# Patient Record
Sex: FEMALE | Employment: FULL TIME | URBAN - METROPOLITAN AREA
[De-identification: names, ages, dates, MRNs, and addresses within clinical notes are randomized per-mention and may not be internally consistent; named-entity substitution may affect disease eponyms.]

---

## 2017-04-07 ENCOUNTER — HOSPITAL ENCOUNTER (EMERGENCY)
Facility: CLINIC | Age: 47
Discharge: HOME | End: 2017-04-07
Attending: EMERGENCY MEDICINE | Admitting: EMERGENCY MEDICINE
Payer: COMMERCIAL

## 2017-04-07 VITALS
RESPIRATION RATE: 16 BRPM | DIASTOLIC BLOOD PRESSURE: 105 MMHG | HEART RATE: 83 BPM | WEIGHT: 216.49 LBS | SYSTOLIC BLOOD PRESSURE: 151 MMHG | HEIGHT: 60 IN | OXYGEN SATURATION: 96 % | BODY MASS INDEX: 42.5 KG/M2 | TEMPERATURE: 97.7 F

## 2017-04-07 DIAGNOSIS — L55.9 SUNBURN: ICD-10-CM

## 2017-04-07 PROCEDURE — 99282 EMERGENCY DEPT VISIT SF MDM: CPT

## 2017-04-07 RX ORDER — LIDOCAINE HCL AND HYDROCORTISONE ACETATE 28; 5.5 MG/G; MG/G
1 GEL RECTAL 3 TIMES DAILY
Qty: 1 TUBE | Refills: 0 | Status: SHIPPED | OUTPATIENT
Start: 2017-04-07 | End: 2018-10-13

## 2017-04-07 RX ORDER — CETIRIZINE HYDROCHLORIDE 10 MG/1
10 TABLET ORAL 2 TIMES DAILY PRN
Qty: 20 TABLET | Refills: 0 | Status: SHIPPED | OUTPATIENT
Start: 2017-04-07 | End: 2017-04-17

## 2017-04-07 RX ORDER — IBUPROFEN 600 MG/1
600 TABLET, FILM COATED ORAL EVERY 6 HOURS PRN
Qty: 30 TABLET | Refills: 0 | Status: SHIPPED | OUTPATIENT
Start: 2017-04-07 | End: 2018-10-13

## 2017-04-07 ASSESSMENT — ENCOUNTER SYMPTOMS
CHILLS: 0
FEVER: 0

## 2017-04-07 NOTE — DISCHARGE INSTRUCTIONS
"  Quemadura [place] [Sunburn]  Whit quemadura del sol es un daño a la piel causado por la sobre-exposición a la elke ultravioleta bettina (estar mucho tiempo en el sol). Se va a jace de 1 a 3 días en sanarse. Las quemaduras bettina severas pueden causar que se forma ampollas y drenaje de líquido de la piel afectada con riesgo de infección.  Cuidado En [place]: Quemaduras  1) Babar el primer día, ponga bolsas de hielo para reducir el dolor noreen. Las cremas/pomadas que se compran sin receta médica y los aerosoles (esprays) (cynthia Solarcaine y otros), contienen whit substancia anestésica que también giorgio el dolor.  2) Si le pusieron un vendaje, cámbielo cada día. Si el vendaje se pega a la herida, remoja el vendaje en agua tibia.  3) Lave la parte quemada con agua y jabón todos los jolene. Séquela suavemente (sin frotarla) con whit toalla limpia.  4) Puede usar acetaminofén (Tylenol) o ibuprofeno (Motrin, Advil) para controlar el dolor, a menos que le receten otro medicamento para el dolor. [ NOTA : Si sufre de enfermedad del hígado o riñones, o alguna vez tuvo whit úlcera estomacal o sangrado gastrointestinal, hable con lee médico antes de usar estos medicamentos.] No use ibuprofeno con niños menores de seis meses de edad.  Seguimiento:  La mayoría de las quemaduras del sol se sanan sin infección. Josiane, de vez en cuando whit infección puede ocurrir a pesar de recibir tratamiento adecuado. Así que, debe observar por las siguientes señales de infección escrita abajo (en \"Regrese Inmediatamente\").  Busque Prontamente Atención Médica  si algo de lo siguiente ocurre:  -- Aumento de dolor  -- Aumento de enrojecimiento, hinchazón o le sale pus de la herida  -- Fiebre por encima de los 100.0'F (37.8'C) oral    [id number] [employer]. [address] [place] [id number]. Todos los derechos reservados. Esta información no pretende sustituir la atención médica profesional. Sólo lee médico puede diagnosticar y tratar " un problema de reece.
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
## 2017-04-07 NOTE — ED AVS SNAPSHOT
Emergency Department    [address] [facility] [address]    Phone:  [phone]    Fax:  [phone]                                       Mi Waller   MRN: [id number]    Department:   Emergency Department   Date of Visit:  4/7/2017           Patient Information     Date Of Birth          1970        Your diagnoses for this visit were:     Sunburn        You were seen by Nancy Stewart MD.      Follow-up Information     Follow up with Elfego Banks MD. Schedule an appointment as soon as possible for a visit in 3 days.    Specialty:  Internal Medicine    Why:  As needed    Contact information:    [employer]  [address]  [facility] [address] [phone]          Follow up with  Emergency Department.    Specialty:  EMERGENCY MEDICINE    Why:  If symptoms worsen    Contact information:    [address] [facility] [id number] [phone]        Discharge Instructions         Quemadura [place] [Sunburn]  Whit quemadura del sol es un daño a la piel causado por la sobre-exposición a la elke ultravioleta bettina (estar mucho tiempo en el sol). Se va a jace de 1 a 3 días en sanarse. Las quemaduras bettina severas pueden causar que se forma ampollas y drenaje de líquido de la piel afectada con riesgo de infección.  Cuidado En [place]: Quemaduras  1) Babar el primer día, ponga bolsas de hielo para reducir el dolor noreen. Las cremas/pomadas que se compran sin receta médica y los aerosoles (esprays) (cynthia Solarcaine y otros), contienen whit substancia anestésica que también giorgio el dolor.  2) Si le pusieron un vendaje, cámbielo cada día. Si el vendaje se pega a la herida, remoja el vendaje en agua tibia.  3) Lave la parte quemada con agua y jabón todos los jolene. Séquela suavemente (sin frotarla) con whit toalla limpia.  4) Puede usar acetaminofén (Tylenol) o ibuprofeno (Motrin, Advil) para controlar el dolor, a menos que le receten otro medicamento  "para el dolor. [ NOTA : Si sufre de enfermedad del hígado o riñones, o alguna vez tuvo whit úlcera estomacal o sangrado gastrointestinal, hable con lee médico antes de usar estos medicamentos.] No use ibuprofeno con niños menores de seis meses de edad.  Seguimiento:  La mayoría de las quemaduras del sol se sanan sin infección. Josiane, de vez en cuando whit infección puede ocurrir a pesar de recibir tratamiento adecuado. Así que, debe observar por las siguientes señales de infección escrita abajo (en \"Regrese Inmediatamente\").  Busque Prontamente Atención Médica  si algo de lo siguiente ocurre:  -- Aumento de dolor  -- Aumento de enrojecimiento, hinchazón o le sale pus de la herida  -- Fiebre por encima de los 100.0'F (37.8'C) oral    [id number] [employer]. [address] [place] [address], [place] [address]. Todos los derechos reservados. Esta información no pretende sustituir la atención médica profesional. Sólo lee médico puede diagnosticar y tratar un problema de reece.          24 Hour Appointment Hotline       To make an appointment at any [facility] clinic, call [phone] ([phone]). If you don't have a family doctor or clinic, we will help you find one. [facility] clinics are conveniently located to serve the needs of you and your family.             Review of your medicines      START taking        Dose / Directions Last dose taken    cetirizine 10 MG tablet   Commonly known as:  zyrTEC   Dose:  10 mg   Quantity:  20 tablet        Take 1 tablet (10 mg) by mouth 2 times daily as needed for allergies (1 tab up to twice a day as needed for itch, rash, hives, allergy)   Refills:  0        lidocaine-hydrocortisone ace 2.8-0.55 % Gel   Dose:  1 Application   Quantity:  1 Tube        Place 1 Application rectally 3 times daily   Refills:  0          CONTINUE these medicines which may have CHANGED, or have new prescriptions. If we are uncertain of the size of tablets/capsules you have at home, strength may be " listed as something that might have changed.        Dose / Directions Last dose taken    * ibuprofen 600 MG tablet   Commonly known as:  ADVIL/MOTRIN   Dose:  600 mg   What changed:  Another medication with the same name was added. Make sure you understand how and when to take each.   Quantity:  30 tablet        Take 1 tablet (600 mg) by mouth every 6 hours as needed for moderate pain   Refills:  1        * ibuprofen 600 MG tablet   Commonly known as:  ADVIL/MOTRIN   Dose:  600 mg   What changed:  You were already taking a medication with the same name, and this prescription was added. Make sure you understand how and when to take each.   Quantity:  30 tablet        Take 1 tablet (600 mg) by mouth every 6 hours as needed for moderate pain   Refills:  0        * Notice:  This list has 2 medication(s) that are the same as other medications prescribed for you. Read the directions carefully, and ask your doctor or other care provider to review them with you.      Our records show that you are taking the medicines listed below. If these are incorrect, please call your family doctor or clinic.        Dose / Directions Last dose taken    loperamide 2 MG tablet   Commonly known as:  IMODIUM A-D   Dose:  2 mg   Quantity:  20 tablet        Take 1 tablet (2 mg) by mouth 4 times daily as needed for diarrhea   Refills:  0        traMADol 50 MG tablet   Commonly known as:  ULTRAM   Dose:   mg   Quantity:  20 tablet        Take 1-2 tablets ( mg) by mouth every 6 hours as needed for moderate pain   Refills:  0                Prescriptions were sent or printed at these locations (3 Prescriptions)                   Other Prescriptions                Printed at Department/Unit printer (3 of 3)         cetirizine (ZYRTEC) 10 MG tablet               lidocaine-hydrocortisone ace 2.8-0.55 % GEL               ibuprofen (ADVIL/MOTRIN) 600 MG tablet                Orders Needing Specimen Collection     None      Pending Results      No orders found from 4/5/2017 to 4/8/2017.            Pending Culture Results     No orders found from 4/5/2017 to 4/8/2017.            Test Results From Your Hospital Stay               Clinical Quality Measure: Blood Pressure Screening     Your blood pressure was checked while you were in the emergency department today. The last reading we obtained was  BP: (!) 151/105 . Please read the guidelines below about what these numbers mean and what you should do about them.  If your systolic blood pressure (the top number) is less than 120 and your diastolic blood pressure (the bottom number) is less than 80, then your blood pressure is normal. There is nothing more that you need to do about it.  If your systolic blood pressure (the top number) is 120-139 or your diastolic blood pressure (the bottom number) is 80-89, your blood pressure may be higher than it should be. You should have your blood pressure rechecked within a year by a primary care provider.  If your systolic blood pressure (the top number) is 140 or greater or your diastolic blood pressure (the bottom number) is 90 or greater, you may have high blood pressure. High blood pressure is treatable, but if left untreated over time it can put you at risk for heart attack, stroke, or kidney failure. You should have your blood pressure rechecked by a primary care provider within the next 4 weeks.  If your provider in the emergency department today gave you specific instructions to follow-up with your doctor or provider even sooner than that, you should follow that instruction and not wait for up to 4 weeks for your follow-up visit.        Thank you for choosing [facility]       Thank you for choosing [facility] for your care. Our goal is always to provide you with excellent care. Hearing back from our patients is one way we can continue to improve our services. Please take a few minutes to complete the written survey that you may receive in the mail after you visit  "with us. Thank you!        [employer] Information     [employer] lets you send messages to your doctor, view your test results, renew your prescriptions, schedule appointments and more. To sign up, go to [email or web address] . Click on \"Log in\" on the left side of the screen, which will take you to the Welcome page. Then click on \"Sign up Now\" on the right side of the page.     You will be asked to enter the access code listed below, as well as some personal information. Please follow the directions to create your username and password.     Your access code is: [email or web address]  Expires: 2017  5:52 PM     Your access code will  in 90 days. If you need help or a new code, please call your [facility] clinic or [phone].        Care EveryWhere ID     This is your Care EveryWhere ID. This could be used by other organizations to access your [facility] medical records  [id number]        After Visit Summary       This is your record. Keep this with you and show to your community pharmacist(s) and doctor(s) at your next visit.                  "

## 2017-04-07 NOTE — ED AVS SNAPSHOT
Emergency Department    [address] [facility] [address]    Phone:  [phone]    Fax:  [phone]                                       Mi Waller   MRN: [id number]    Department:   Emergency Department   Date of Visit:  4/7/2017           After Visit Summary Signature Page     I have received my discharge instructions, and my questions have been answered. I have discussed any challenges I see with this plan with the nurse or doctor.    ..........................................................................................................................................  Patient/Patient Representative Signature      ..........................................................................................................................................  Patient Representative Print Name and Relationship to Patient    ..................................................               ................................................  Date                                            Time    ..........................................................................................................................................  Reviewed by Signature/Title    ...................................................              ..............................................  Date                                                            Time

## 2018-10-13 ENCOUNTER — HOSPITAL ENCOUNTER (INPATIENT)
Facility: CLINIC | Age: 48
LOS: 2 days | Discharge: HOME | DRG: 300 | End: 2018-10-15
Attending: EMERGENCY MEDICINE | Admitting: INTERNAL MEDICINE
Payer: COMMERCIAL

## 2018-10-13 ENCOUNTER — APPOINTMENT (OUTPATIENT)
Dept: CT IMAGING | Facility: CLINIC | Age: 48
DRG: 300 | End: 2018-10-13
Attending: EMERGENCY MEDICINE
Payer: COMMERCIAL

## 2018-10-13 DIAGNOSIS — I77.74 VERTEBRAL ARTERY DISSECTION (H): ICD-10-CM

## 2018-10-13 DIAGNOSIS — E78.5 HYPERLIPIDEMIA LDL GOAL <100: Primary | ICD-10-CM

## 2018-10-13 DIAGNOSIS — R73.03 PREDIABETES: ICD-10-CM

## 2018-10-13 PROBLEM — I10 BENIGN ESSENTIAL HYPERTENSION: Status: ACTIVE | Noted: 2018-10-13

## 2018-10-13 LAB
CREAT BLD-MCNC: 0.7 MG/DL (ref 0.52–1.04)
GFR SERPL CREATININE-BSD FRML MDRD: 89 ML/MIN/1.7M2
TROPONIN I SERPL-MCNC: <0.015 UG/L (ref 0–0.04)

## 2018-10-13 PROCEDURE — 99285 EMERGENCY DEPT VISIT HI MDM: CPT | Mod: 25

## 2018-10-13 PROCEDURE — 99223 1ST HOSP IP/OBS HIGH 75: CPT | Mod: AI | Performed by: INTERNAL MEDICINE

## 2018-10-13 PROCEDURE — 36415 COLL VENOUS BLD VENIPUNCTURE: CPT | Performed by: INTERNAL MEDICINE

## 2018-10-13 PROCEDURE — 70496 CT ANGIOGRAPHY HEAD: CPT

## 2018-10-13 PROCEDURE — 25000128 H RX IP 250 OP 636: Performed by: EMERGENCY MEDICINE

## 2018-10-13 PROCEDURE — 25000125 ZZHC RX 250: Performed by: EMERGENCY MEDICINE

## 2018-10-13 PROCEDURE — 12000000 ZZH R&B MED SURG/OB

## 2018-10-13 PROCEDURE — 25000128 H RX IP 250 OP 636: Performed by: INTERNAL MEDICINE

## 2018-10-13 PROCEDURE — 70450 CT HEAD/BRAIN W/O DYE: CPT

## 2018-10-13 PROCEDURE — 84484 ASSAY OF TROPONIN QUANT: CPT | Performed by: INTERNAL MEDICINE

## 2018-10-13 PROCEDURE — 25000132 ZZH RX MED GY IP 250 OP 250 PS 637: Performed by: EMERGENCY MEDICINE

## 2018-10-13 PROCEDURE — 96375 TX/PRO/DX INJ NEW DRUG ADDON: CPT

## 2018-10-13 PROCEDURE — 96361 HYDRATE IV INFUSION ADD-ON: CPT

## 2018-10-13 PROCEDURE — 96374 THER/PROPH/DIAG INJ IV PUSH: CPT

## 2018-10-13 PROCEDURE — 82565 ASSAY OF CREATININE: CPT

## 2018-10-13 RX ORDER — MORPHINE SULFATE 4 MG/ML
4 INJECTION, SOLUTION INTRAMUSCULAR; INTRAVENOUS
Status: DISCONTINUED | OUTPATIENT
Start: 2018-10-13 | End: 2018-10-13

## 2018-10-13 RX ORDER — SODIUM CHLORIDE 9 MG/ML
1000 INJECTION, SOLUTION INTRAVENOUS CONTINUOUS
Status: DISCONTINUED | OUTPATIENT
Start: 2018-10-13 | End: 2018-10-13

## 2018-10-13 RX ORDER — METOCLOPRAMIDE HYDROCHLORIDE 5 MG/ML
10 INJECTION INTRAMUSCULAR; INTRAVENOUS EVERY 6 HOURS PRN
Status: DISCONTINUED | OUTPATIENT
Start: 2018-10-13 | End: 2018-10-15 | Stop reason: HOSPADM

## 2018-10-13 RX ORDER — DIPHENHYDRAMINE HYDROCHLORIDE 50 MG/ML
25 INJECTION INTRAMUSCULAR; INTRAVENOUS ONCE
Status: COMPLETED | OUTPATIENT
Start: 2018-10-13 | End: 2018-10-13

## 2018-10-13 RX ORDER — LIDOCAINE 40 MG/G
CREAM TOPICAL
Status: DISCONTINUED | OUTPATIENT
Start: 2018-10-13 | End: 2018-10-15 | Stop reason: HOSPADM

## 2018-10-13 RX ORDER — IBUPROFEN 600 MG/1
600 TABLET, FILM COATED ORAL EVERY 8 HOURS PRN
Status: ON HOLD | COMMUNITY
End: 2018-10-15

## 2018-10-13 RX ORDER — IOPAMIDOL 755 MG/ML
70 INJECTION, SOLUTION INTRAVASCULAR ONCE
Status: COMPLETED | OUTPATIENT
Start: 2018-10-13 | End: 2018-10-13

## 2018-10-13 RX ORDER — ASPIRIN 325 MG
325 TABLET ORAL ONCE
Status: COMPLETED | OUTPATIENT
Start: 2018-10-13 | End: 2018-10-13

## 2018-10-13 RX ORDER — NALOXONE HYDROCHLORIDE 0.4 MG/ML
.1-.4 INJECTION, SOLUTION INTRAMUSCULAR; INTRAVENOUS; SUBCUTANEOUS
Status: DISCONTINUED | OUTPATIENT
Start: 2018-10-13 | End: 2018-10-15 | Stop reason: HOSPADM

## 2018-10-13 RX ORDER — AMOXICILLIN 250 MG
1 CAPSULE ORAL 2 TIMES DAILY
Status: DISCONTINUED | OUTPATIENT
Start: 2018-10-13 | End: 2018-10-15 | Stop reason: HOSPADM

## 2018-10-13 RX ORDER — BISACODYL 10 MG
10 SUPPOSITORY, RECTAL RECTAL DAILY PRN
Status: DISCONTINUED | OUTPATIENT
Start: 2018-10-13 | End: 2018-10-15 | Stop reason: HOSPADM

## 2018-10-13 RX ORDER — LORAZEPAM 2 MG/ML
0.5 INJECTION INTRAMUSCULAR ONCE
Status: COMPLETED | OUTPATIENT
Start: 2018-10-13 | End: 2018-10-13

## 2018-10-13 RX ORDER — PANTOPRAZOLE SODIUM 40 MG/1
40 TABLET, DELAYED RELEASE ORAL DAILY
Status: DISCONTINUED | OUTPATIENT
Start: 2018-10-14 | End: 2018-10-15 | Stop reason: HOSPADM

## 2018-10-13 RX ORDER — ONDANSETRON 2 MG/ML
4 INJECTION INTRAMUSCULAR; INTRAVENOUS EVERY 6 HOURS PRN
Status: DISCONTINUED | OUTPATIENT
Start: 2018-10-13 | End: 2018-10-15 | Stop reason: HOSPADM

## 2018-10-13 RX ORDER — SODIUM CHLORIDE 9 MG/ML
INJECTION, SOLUTION INTRAVENOUS CONTINUOUS
Status: DISCONTINUED | OUTPATIENT
Start: 2018-10-13 | End: 2018-10-14

## 2018-10-13 RX ORDER — POTASSIUM CHLORIDE 1.5 G/1.58G
40 POWDER, FOR SOLUTION ORAL ONCE
Status: DISCONTINUED | OUTPATIENT
Start: 2018-10-13 | End: 2018-10-13

## 2018-10-13 RX ORDER — OXYCODONE HYDROCHLORIDE 5 MG/1
5-10 TABLET ORAL
Status: DISCONTINUED | OUTPATIENT
Start: 2018-10-13 | End: 2018-10-15 | Stop reason: HOSPADM

## 2018-10-13 RX ORDER — LABETALOL HYDROCHLORIDE 5 MG/ML
10 INJECTION, SOLUTION INTRAVENOUS
Status: DISCONTINUED | OUTPATIENT
Start: 2018-10-13 | End: 2018-10-15 | Stop reason: HOSPADM

## 2018-10-13 RX ORDER — MORPHINE SULFATE 4 MG/ML
4 INJECTION, SOLUTION INTRAMUSCULAR; INTRAVENOUS
Status: COMPLETED | OUTPATIENT
Start: 2018-10-13 | End: 2018-10-13

## 2018-10-13 RX ORDER — ONDANSETRON 4 MG/1
4 TABLET, ORALLY DISINTEGRATING ORAL EVERY 6 HOURS PRN
Status: DISCONTINUED | OUTPATIENT
Start: 2018-10-13 | End: 2018-10-15 | Stop reason: HOSPADM

## 2018-10-13 RX ORDER — AMOXICILLIN 250 MG
2 CAPSULE ORAL 2 TIMES DAILY
Status: DISCONTINUED | OUTPATIENT
Start: 2018-10-13 | End: 2018-10-15 | Stop reason: HOSPADM

## 2018-10-13 RX ORDER — PROCHLORPERAZINE 25 MG
25 SUPPOSITORY, RECTAL RECTAL EVERY 12 HOURS PRN
Status: DISCONTINUED | OUTPATIENT
Start: 2018-10-13 | End: 2018-10-15 | Stop reason: HOSPADM

## 2018-10-13 RX ORDER — LOSARTAN POTASSIUM 50 MG/1
50 TABLET ORAL DAILY
Status: DISCONTINUED | OUTPATIENT
Start: 2018-10-14 | End: 2018-10-15 | Stop reason: HOSPADM

## 2018-10-13 RX ORDER — METOCLOPRAMIDE HYDROCHLORIDE 5 MG/ML
10 INJECTION INTRAMUSCULAR; INTRAVENOUS ONCE
Status: COMPLETED | OUTPATIENT
Start: 2018-10-13 | End: 2018-10-13

## 2018-10-13 RX ORDER — PROCHLORPERAZINE MALEATE 10 MG
10 TABLET ORAL EVERY 6 HOURS PRN
Status: DISCONTINUED | OUTPATIENT
Start: 2018-10-13 | End: 2018-10-15 | Stop reason: HOSPADM

## 2018-10-13 RX ORDER — LOSARTAN POTASSIUM 25 MG/1
25 TABLET ORAL DAILY
Status: DISCONTINUED | OUTPATIENT
Start: 2018-10-14 | End: 2018-10-13

## 2018-10-13 RX ORDER — METOCLOPRAMIDE 10 MG/1
10 TABLET ORAL EVERY 6 HOURS PRN
Status: DISCONTINUED | OUTPATIENT
Start: 2018-10-13 | End: 2018-10-15 | Stop reason: HOSPADM

## 2018-10-13 RX ORDER — LOSARTAN POTASSIUM 50 MG/1
50 TABLET ORAL DAILY
COMMUNITY

## 2018-10-13 RX ORDER — LOSARTAN POTASSIUM 25 MG/1
50 TABLET ORAL DAILY
Status: ON HOLD | COMMUNITY
End: 2018-10-13

## 2018-10-13 RX ADMIN — MORPHINE SULFATE 4 MG: 4 INJECTION INTRAVENOUS at 21:02

## 2018-10-13 RX ADMIN — LORAZEPAM 0.5 MG: 2 INJECTION INTRAMUSCULAR; INTRAVENOUS at 17:45

## 2018-10-13 RX ADMIN — SODIUM CHLORIDE, PRESERVATIVE FREE 90 ML: 5 INJECTION INTRAVENOUS at 19:11

## 2018-10-13 RX ADMIN — METOCLOPRAMIDE 10 MG: 5 INJECTION, SOLUTION INTRAMUSCULAR; INTRAVENOUS at 16:22

## 2018-10-13 RX ADMIN — IOPAMIDOL 70 ML: 755 INJECTION, SOLUTION INTRAVENOUS at 19:10

## 2018-10-13 RX ADMIN — ASPIRIN 325 MG ORAL TABLET 325 MG: 325 PILL ORAL at 21:15

## 2018-10-13 RX ADMIN — SODIUM CHLORIDE 1000 ML: 9 INJECTION, SOLUTION INTRAVENOUS at 15:55

## 2018-10-13 RX ADMIN — DIPHENHYDRAMINE HYDROCHLORIDE 25 MG: 50 INJECTION, SOLUTION INTRAMUSCULAR; INTRAVENOUS at 16:25

## 2018-10-13 RX ADMIN — SODIUM CHLORIDE: 9 INJECTION, SOLUTION INTRAVENOUS at 22:50

## 2018-10-13 ASSESSMENT — ENCOUNTER SYMPTOMS
WEAKNESS: 1
SHORTNESS OF BREATH: 0
HEADACHES: 1
CHILLS: 1
ABDOMINAL PAIN: 0
DIZZINESS: 0

## 2018-10-13 NOTE — IP AVS SNAPSHOT
[address] [facility]    [facility] [address]    [place] [address]    Phone:  [phone]                                       After Visit Summary   10/13/2018    Mi Waller    MRN: [id number]           After Visit Summary Signature Page     I have received my discharge instructions, and my questions have been answered. I have discussed any challenges I see with this plan with the nurse or doctor.    ..........................................................................................................................................  Patient/Patient Representative Signature      ..........................................................................................................................................  Patient Representative Print Name and Relationship to Patient    ..................................................               ................................................  Date                                   Time    ..........................................................................................................................................  Reviewed by Signature/Title    ...................................................              ..............................................  Date                                               Time          22EPIC Rev 08/18

## 2018-10-13 NOTE — ED PROVIDER NOTES
History     Chief Complaint:  Headache    HPI   A phone   was used obtain the below history as well as to explain diagnosis, plan of treatment, reasons to return to the emergency department and appropriate follow up.    Mi Waller is a 47 year old female who presents to the ED for evaluation of a headache. She reports that she has had an intense headache for the last 4 days. The first day, she had just woken up, around 0430, and was brushing her teeth when the headache came on suddenly - as if someone had hit her. While she has taken ibuprofen at home to try and relieve the pain, she reports that the pain has not completely gone away since the first day, rather it calms down for a bit after taking medicine. Because the pain has not gone away, she decided to present to the ED today. Here, she reports that the pain is throbbing and it starts in her occipital scalp and radiates to the top of her head. Additionally, she also feels pain radiating down to her shoulders. Currently, she reports the pain is at a 10/10, and that she has never experienced pain in this location, of this quality, or this strength previously. She also reports feeling chilled, intermittent double vision, and right sided arm and facial weakness over the last four days. She denies any chest pain, shortness of breath, rashes, abdominal pain, or dizziness. She reports that she takes medication for her hypertension; she takes this regularly with her noontime meal.     Allergies:  Penicillin     Medications:    Reports that she takes a HTN medicine    Past Medical History:    HTN    Past Surgical History:    Hysterectomy    Family History:    No past pertinent family history.    Social History:  Marital Status:   [2]  Presents with   Negative for alcohol use.  Negative for tobacco use.     Review of Systems   Constitutional: Positive for chills.   Eyes: Negative for visual disturbance.   Respiratory: Negative for shortness  of breath.    Cardiovascular: Negative for chest pain.   Gastrointestinal: Negative for abdominal pain.   Skin: Negative for rash.   Neurological: Positive for weakness (right side) and headaches. Negative for dizziness.   All other systems reviewed and are negative.    Physical Exam     Patient Vitals for the past 24 hrs:   BP Temp Temp src Pulse Resp SpO2 Height Weight   10/13/18 2115 - - - 86 - 96 % - -   10/13/18 2100 125/87 - - 74 - 97 % - -   10/13/18 2030 130/77 - - 79 18 97 % - -   10/13/18 2015 - - - - - 94 % - -   10/13/18 2000 127/77 - - 70 16 93 % - -   10/13/18 1930 143/82 - - 77 18 95 % - -   10/13/18 1845 141/86 - - - - 94 % - -   10/13/18 1730 133/82 - - - - 95 % - -   10/13/18 1700 141/79 - - - 15 96 % - -   10/13/18 1440 (!) 163/100 98.8  F (37.1  C) Oral 76 18 98 % 1.524 m (5') 95.7 kg (211 lb)     Physical Exam  Constitutional: Well developed, nontox appearance  Head: Atraumatic.   Mouth/Throat: Oropharynx is clear and moist.   Neck:  no stridor, R posteriolateral neck tenderness extending to occipital ridge  Eyes: no scleral icterus, PERRL, EOMI  Cardiovascular: RRR, 2+ bilat radial pulses  Pulmonary/Chest: nml resp effort, Clear BS bilat  Abdominal: ND, +BS, soft, NT, no rebound or guarding   Ext: Warm, well perfused, no edema  Neurological: A&O,  CNII-XII intact, nml finger to nose, 5/5 strength throughout upper and lower ext, symmetric; sensation grossly intact  Skin: Skin is warm and dry.   Psychiatric: Behavior is normal. Thought content normal.   Nursing note and vitals reviewed.                Emergency Department Course   Imaging:  Radiographic findings were communicated with the patient and family who voiced understanding of the findings.    CT Head without contrast:   No acute intracranial abnormality, as per radiology.    CTA Head w/ contrast:   Moderate to severe narrowing of the mid right vertebral  artery V4 segment over a 1 cm segment, age indeterminate. Differential  diagnosis  includes nonocclusive dissection (favored), atypical  atherosclerotic plaquing (less likely). The involved segment contains  the PICA origin. There is slightly asymmetrically decreased contrast  opacification of the right PICA compared to the left which could be  physiologic, however acute narrowing cannot be excluded. This could be  further evaluated with angiography if clinically indicated, as per radiology.     MR Neck (Carotids) w/o and w/ contrast angiogram:   Pending upon admission    MR Brain w/o and w/ contrast:   Pending upon admission    Labs:   Creatinine POCT: Creatinine: 0.7, GFR: 89  Creatinine: Pending.     Interventions:  1555 NS 1L IV  1622 Reglan, 10 mg, IV  1625 Benadryl, 25 mg, IV  1745 Ativan, 0.5 mg, IV  2102 Morphine, 4 mg, IV  2115 Aspirin, 325 mg, Oral    Please see MAR for full list of medications administered in the ED.    Emergency Department Course:  Nursing notes and vitals reviewed. (1524) I performed an exam of the patient as documented above.      IV inserted. Medicine administered as documented above. Blood drawn. This was sent to the lab for further testing, results above.     The patient was sent for a head CT while in the emergency department, findings above.      (1750) I rechecked the patient and attempted to perform a lumbar puncture which was unsuccessful. I ordered a CTA Head instead.      (1955) I consulted with Dr. Abraham, vascular surgery, regarding the patient's history and presentation here in the emergency department. They referred me to Neurology Critical Care for another opinion.    (2036) I reevaluated the patient and provided an update in regards to her ED course.      (2051) I consulted with Dr. Andujar , Neuro-clinical care. They are in agreement that the patient should be admitted.     (2122) I updated the patient on the results of her work-up in the ED, and we discussed her imminent admission.     (2133) I consulted with Dr. Lai of the hospitalist services.  They are in agreement to accept the patient for admission.    Findings and plan explained to the Patient and spouse who consents to admission. Discussed the patient with Dr. Lai, who will admit the patient to an inpatient bed for further monitoring, evaluation, and treatment.     I personally reviewed the laboratory results with the Patient and answered all related questions prior to admission.     Impression & Plan      Medical Decision Making:  Mi Waller is a 47 year old female presenting with sudden onset headache    Differential diagnosis includes migraine, complex migraine, tension headache, intracranial hemorrhage, headache NOS.  Given the sudden onset of the patient's symptoms including her past medical history significant for hypertension I think would be reasonable to perform a CT scan to evaluate for subarachnoid hemorrhage.  CT negative.  I recommended a lumbar puncture for further evaluation which was unsuccessful.  CTA head was subsequently ordered for further evaluation and revealed a likely right vertebral artery dissection which would be consistent with the patient's symptoms.  Discussed patient with vascular surgery and neuro critical care and subsequently admitted the patient to hospitalist.  MRI brain and MRA neck were ordered per neuro critical care's request and are pending upon admission.  Patient and  were counseled on the results, diagnosis and disposition.  She is understanding and agreeable to plan.  She was subsequently admitted in stable condition.  Aspirin given prior to admission per neuro recs.      Diagnosis:    ICD-10-CM    1. Vertebral artery dissection (H) I77.74        Disposition:  Admitted to Dr. Lai    Scribe Disclosure:  I, Vonda Cervantes, am serving as a scribe on 10/13/2018 at 3:24 PM to personally document services performed by Hunter Cerda MD based on my observations and the provider's statements to me.     Vonda Cervantes  10/13/2018   [facility] EMERGENCY  DEPARTMENT       Hunter Cerda MD  10/14/18 0002

## 2018-10-13 NOTE — IP AVS SNAPSHOT
MRN:[id number]                      After Visit Summary   10/13/2018    Mi Waller    MRN: [id number]           Thank you!     Thank you for choosing [facility] for your care. Our goal is always to provide you with excellent care. Hearing back from our patients is one way we can continue to improve our services. Please take a few minutes to complete the written survey that you may receive in the mail after you visit with us. Thank you!        Patient Information     Date Of Birth          1970        Designated Caregiver       Most Recent Value    Caregiver    Will someone help with your care after discharge? yes    Name of designated caregiver Grabiel    Phone number of caregiver See facesheet    Caregiver address See facesheet      About your hospital stay     You were admitted on:  October 13, 2018 You last received care in the:  [address] [facility]    You were discharged on:  October 15, 2018        Reason for your hospital stay       Mrs. Waller: You came to the hospital with an unusual headache. It turns out one of the arteries to the back of your brain, that is in the right side of your neck had torn.  Fortunately, this did not affect your brain. However, you need to be on a full aspirin pill each day and we need to treat your cholesterol aggressively, keep your blood pressure in control and control your sugar. I've suggest you start to lose weight and discussed Weight Watchers and Medifast if needed. Please discuss this with Dr. Anders.  I don't want you to work until Dr. Anders says you can go back to work. I want you to see her the end of this week.  I'd suggest that Dr. Anders give you a note on the time back, maybe starting at half days and with the understanding that if your neck bothers you more, you be allowed to rest or go home.                  Who to Call     For medical emergencies, please call 911.  For non-urgent questions about your medical care,  please call your primary care provider or clinic, [phone]          Attending Provider     Provider Specialty    Hunter Cerda MD Emergency Medicine    Tamika Lai MD Internal Medicine       Primary Care Provider Office Phone # Fax #    Nishi MOYER [phone]      After Care Instructions     Activity       Your activity upon discharge: ambulate in house ok to be up and about, but limit activity and no heavy lifting until you see the Neurologist            Diet       Follow this diet upon discharge: Orders Placed This Encounter      Combination Diet 6327-8601 Calories: Low Consistent CHO (3-5 CHO units/meal); 3 gm NA Diet                  Follow-up Appointments     Follow-up and recommended labs and tests        Please Follow-up with [facility] in 3 months.  Please call to schedule appointment.  [phone]            Follow-up and recommended labs and tests        Follow up with your primary MD Wednesday October 17th at 1:00 PM                  Additional Services     Nutrition Referral                 Further instructions from your care team       Your risk factors for stroke or TIA (transient ischemic attack):    Your Risk Factors Your Results Normal Ranges   High blood pressure BP Readings from Last 1 Encounters:   10/15/18 135/81    Less than 120/80   Cholesterol              Total Lab Results   Component Value Date    CHOL 187 10/14/2018      Less than 150    Triglycerides   Lab Results   Component Value Date    TRIG 158 10/14/2018    Less than 150   LDL Lab Results   Component Value Date     10/14/2018       Less than 70   HDL Lab Results   Component Value Date    HDL 39 10/14/2018            Greater than 40 (men)  Greater than 50 (women)   Diabetes   Recent Labs  Lab 10/14/18  0550   GLC 94    Fasting blood glucose    Smoking/tobacco use  Quit smoking and tobacco   Overweight  Lose 1-2 pounds a week   Lack of exercise  30 minutes  moderate activity each day   Other risk factors include carotid (neck) artery disease, atrial fibrillation and stress. You may be on new medicine to treat high blood pressure, cholesterol, diabetes or atrial fibrillation.    Understanding Stroke Booklet given to patient. Please refer to booklet for further information.    Stroke warning signs and symptoms - CALL 911 right away for:  - Sudden numbness or weakness in the face, arm or leg (often on one side of the body).  - Sudden confusion or trouble understanding what is going on.  - Sudden blurred or decreased vision in one or both eyes.  - Sudden trouble speaking, loss of balance, dizziness or problems with coordination.  - Sudden, severe headache for no reason.  - Fainting or seizures.  - Symptoms may go away then come back suddenly.      Please follow up with neurology in 3 months. You may call any of the following neurology clinics for an appointment or a clinic of your choice. Tell them you were recently hospitalized and need follow up as recommended at discharge.    1. [facility]   [address] [place] [address], [address]   [place], [place] [address] [phone]  2. [facility]   [address], [address]   [place] [address]   [phone]  3. [facility]   Cayetano Mota MD   [address] [place], [place] [id number] [phone]  4. [facility] Carolyne Mota MD   [address] [place] [address]   [phone]      Neurology also recommends follow-up MRA    Pending Results     No orders found from 10/11/2018 to 10/14/2018.            Statement of Approval     Ordered          10/15/18 1431  I have reviewed and agree with all the recommendations and orders detailed in this document.  EFFECTIVE NOW     Approved and electronically signed by:  Mauricio Hudson MD             Admission Information     Date & Time Provider Department Dept. Phone    10/13/2018 Tamika Lai  "MD [facility] [phone]      Your Vitals Were     Blood Pressure Pulse Temperature Respirations Height Weight    135/81 (BP Location: Left arm) 67 98.2  F (36.8  C) (Oral) 16 1.524 m (5') 93.8 kg (206 lb 12.7 oz)    Pulse Oximetry BMI (Body Mass Index)                96% 40.39 kg/m2          [employer] Information     [employer] lets you send messages to your doctor, view your test results, renew your prescriptions, schedule appointments and more. To sign up, go to [email or web address] . Click on \"Log in\" on the left side of the screen, which will take you to the Welcome page. Then click on \"Sign up Now\" on the right side of the page.     You will be asked to enter the access code listed below, as well as some personal information. Please follow the directions to create your username and password.     Your access code is: [email or web address]  Expires: 2019  1:37 PM     Your access code will  in 90 days. If you need help or a new code, please call your [facility] clinic or [phone].        Care EveryWhere ID     This is your Care EveryWhere ID. This could be used by other organizations to access your [facility] medical records  [id number]        Equal Access to Services     CHRIS ESPINOSA AH: Rogelio hendrixo Sotheresa, waaxda luqadaha, qaybta kaalmada jorge, jeff holland. So [facility] [phone].    ATENCIÓN: Si habla español, tiene a lee disposición servicios gratuitos de asistencia lingüística. Llame al [phone].    We comply with applicable federal civil rights laws and [place] laws. We do not discriminate on the basis of race, color, national origin, age, disability, sex, sexual orientation, or gender identity.               Review of your medicines      START taking        Dose / Directions    aspirin 325 MG tablet        Dose:  325 mg   Start taking on:  10/16/2018   Take 1 tablet (325 mg) by mouth daily   Quantity:  180 tablet   Refills:  " 0       atorvastatin 40 MG tablet   Commonly known as:  LIPITOR        Dose:  40 mg   Take 1 tablet (40 mg) by mouth every evening   Quantity:  30 tablet   Refills:  0         CONTINUE these medicines which have NOT CHANGED        Dose / Directions    losartan 50 MG tablet   Commonly known as:  COZAAR        Dose:  50 mg   Take 50 mg by mouth daily   Refills:  0         STOP taking     ibuprofen 600 MG tablet   Commonly known as:  ADVIL/MOTRIN                Where to get your medicines      These medications were sent to [facility] [address] [address] AT [facility] [address] [facility] [address]     Phone:  [phone]     atorvastatin 40 MG tablet         Some of these will need a paper prescription and others can be bought over the counter. Ask your nurse if you have questions.     You don't need a prescription for these medications     aspirin 325 MG tablet                Protect others around you: Learn how to safely use, store and throw away your medicines at www.disposemymeds.org.             Medication List: This is a list of all your medications and when to take them. Check marks below indicate your daily home schedule. Keep this list as a reference.      Medications           Morning Afternoon Evening Bedtime As Needed    aspirin 325 MG tablet   Take 1 tablet (325 mg) by mouth daily   Start taking on:  10/16/2018   Last time this was given:  325 mg on 10/15/2018  9:45 AM   Next Dose Due:  10/16/18 AM                                   atorvastatin 40 MG tablet   Commonly known as:  LIPITOR   Take 1 tablet (40 mg) by mouth every evening   Last time this was given:  40 mg on 10/14/2018  8:48 PM   Next Dose Due:  10/15/18 evening                                   losartan 50 MG tablet   Commonly known as:  COZAAR   Take 50 mg by mouth daily   Last time this was given:  50 mg on 10/15/2018  9:45 AM   Next Dose Due:  10/16/18 AM                                              More Information        Oxycodone tablets or capsules  Brand Names: Dazidox, Endocodone, Oxaydo, OxyIR, Percolone, Roxicodone, ROXYBOND   Qué es farnaz medicamento?  La OXICODONA es un analgésico. Se usa para tratar el dolor moderado a severo.   Cómo yeimy utilizar farnaz medicamento?  [place] farnaz medicamento por vía oral con un vaso de agua. Siga las instrucciones de la etiqueta del medicamento. Puede tomarlo con o sin alimentos. Si el medicamento le produce malestar estomacal, tómelo con alimentos. [place] lee medicamento a intervalos regulares. No lo tome con whit frecuencia mayor a la indicada. No deje de tomarlo, excepto si así lo indica lee médico.  Algunas marcas de farnaz medicamento, cynthia Oxecta, tienen instrucciones especiales. Consulte a lee médico o farmacéutico si estas instrucciones son para usted: no yazmin, triture ni mastique farnaz medicamento. Trague solo whit tableta a la vez. No humedezca, moje, ni chupe la tableta antes de tomarla.  Lee farmacéutico le dará whit Guía del medicamento especial (MedGuide, nombre en inglés) con cada receta y en cada ocasión que la vuelva a surtir. Asegúrese de leer esta información cada vez cuidadosamente.  Hable con lee pediatra para informarse acerca del uso de farnaz medicamento en niños. Puede requerir atención especial.   Qué efectos secundarios puedo tener al utilizar farnaz medicamento?  Efectos secundarios que debe informar a lee médico o a lee profesional de la reece tan pronto cynthia sea posible:  reacciones alérgicas, cynthia erupción cutánea, comezón/picazón o urticarias, e hinchazón de la evon, los labios o la lengua problemas respiratorios confusión signos y síntomas de presión sanguínea baja, tales cynthia mareos, sensación de desmayos o aturdimiento, caídas, cansancio o debilidad inusual dificultad para orinar o cambios en el volumen de orina problemas para tragar  Efectos secundarios que generalmente no requieren atención médica (infórmelos a lee médico o a lee  profesional de la reece si persisten o si son molestos):  estreñimiento boca seca náuseas, vómito cansancio   Qué puede interactuar con farnaz medicamento?  Esta medicina puede interactuar con los siguientes medicamentos:  alcohol antihistamínicos para alergia, tos y resfrío medicamentos antivirales para el VIH o SIDA atropina ciertos antibióticos, tales cynthia claritromicina, eritromicina, linezolida, rifampicina ciertos medicamentos para la ansiedad o para conciliar el sueño ciertos medicamentos para problemas de vejiga, tales cynthia oxibutinina, tolterodina ciertos medicamentos para la depresión, cynthia amitriptilina, fluoxetina, sertralina ciertos medicamentos para infecciones micóticas, tales cynthia quetoconazol, itraconazol, voriconazol ciertos medicamentos para la migraña, tales cynthia almotriptán, eletriptán, frovatriptán, naratriptán, rizatriptán, sumatriptán, zolmitriptán ciertos medicamentos para las náuseas o los vómitos, tales cynthia dolasetrón, ondansetrón, palonosetrón ciertos medicamentos para el mal de Parkinson, tales cynthia benzatropina, trihexifenidilo ciertos medicamentos para convulsiones, tales cynthia fenobarbital, fenitoína, primidona ciertos medicamentos para problemas estomacales, tales cynthia diciclomina, hiosciamina ciertos medicamentos para el mareo por movimiento, kali cynthia escopolamina diuréticos anestésicos generales, tales cynthia halotano, isoflurano, metoxiflurano, propofol ipratropio anestésicos locales, tales cynthia lidocaína, pramoxina, tetracaína IMAO, tales cynthia Carbex, Eldepryl, Marplan, Nardil y Parn[place] medicamentos para relajar los músculos antes de whit cirugía stacey de metileno nilotinib otros medicamentos narcóticos para el dolor o la tos fenotiazinas, tales cynthia clorpromazina, mesoridazina, proclorperazina, tioridazina   Qué sucede si me olvido de whit dosis?  Si olvida whit dosis, tómela lo antes posible. Si es edith la hora de la próxima dosis, tome sólo baron dosis. No tome dosis adicionales o  dobles.   Dónde yeimy guardar mi medicina?  Mantenga fuera del alcance de los niños. Existe la posibilidad de abusar de leslie medicamento. Mantenga lee medicamento en un lugar seguro para protegerlo contra robos. No comparta leslie medicamento con nadie. Es peligroso  o regalar leslie medicamento, y está prohibido por la nicky.  Guarde a temperatura ambiente, entre 15 y 30 grados Celsius (59 y 86 grados Fahrenheit). Proteja velasquez. Mantenga el recipiente bam cerrado.  Leslie medicamento puede causar whit sobredosis accidental y la muerte si lo rashi otros adultos, niños o mascotas. Arroje por el sanitario todo el medicamento que no haya usado para reducir las posibilidades de daños. No utilice leslie medicamento después de la fecha de vencimiento.   Qué le yeimy informar a mi profesional de la reece antes de jace leslie medicamento?  Necesitan saber si usted presenta alguno de los siguientes problemas o situaciones:  enfermedad de [place] tumor cerebral lesión de la andrez enfermedad cardiaca antecedentes de abuso de alcohol o drogas si nancy alcohol con frecuencia enfermedad renal enfermedad hepática enfermedad pulmonar o respiratoria, cynthia asma trastorno mental enfermedad pancreática convulsiones enfermedad tiroidea whit reacción inusual o alérgica a la oxicodona, codeína, hidrocodona, morfina, a otros medicamentos, alimentos, colorantes o conservantes si está embarazada o buscando quedar embarazada si está amamantando a un bebé   A qué yeimy estar atento al usar leslie medicamento?  Informe a lee médico o a lee profesional de la reece si el dolor no desaparece, si empeora, o si tiene un dolor nuevo o diferente. Es posible que desarrolle tolerancia al medicamento. Tolerancia significa que necesitará whit dosis más stephanie del medicamento para aliviar el dolor. La tolerancia es normal y previsible si va leslie medicamento por mucho tiempo.  No deje de usar lee medicamento repentinamente porque puede desarrollar whit reacción grave.  Lee cuerpo se acostumbra al medicamento. [place] NO significa que usted es adicto. La adicción es whit conducta relacionada a la obtención y el uso de whit droga por razones que no son médicas. Si usted tiene dolor, tiene whit razón médica para jace el analgésico. Lee médico le dirá cuánto medicamento jace. Si lee médico desea que deje de usar el medicamento, la dosis se irá disminuyendo lentamente xiao un tiempo para evitar cualquier efecto secundario.  Existen distintos tipos de medicamentos narcóticos (opiáceos). Si va más de un tipo al mismo tiempo o si está tomando otro medicamento que también causa somnolencia, es posible que tenga más efectos secundarios. Entréguele a lee proveedor de atención médica whit lista de todos los medicamentos que usa. Lee médico le dirá cuánto medicamento jace. No tome más medicamento que lo indicado. Llame al servicio de emergencias para recibir ayuda si tiene problemas para respirar o somnolencia inusual.  Puede experimentar somnolencia o mareos. No conduzca, no utilice maquinaria ni john nada que le exija permanecer en estado de alerta hasta que sepa cómo le afecta farnaz medicamento. No se siente ni se ponga de pie con rapidez, especialmente si es un paciente de edad avanzada. [place] reduce el riesgo de mareos o desmayos. El alcohol puede interferir con el efecto de farnaz medicamento. Evite consumir bebidas alcohólicas.  Farnaz medicamento causará estreñimiento. Trate de evacuar los intestinos al menos cada 2 o 3 días. Si no evacua los intestinos xiao 3 días, comuníquese con lee médico o con lee profesional de la reece.  Se le podría secar la boca. Masticar chicle sin azúcar, chupar caramelos duros y jace agua en abundancia le ayudará a mantener la boca húmeda. Si el problema no desaparece o es severo, consulte a lee médico.    [id number] [employer]. [address] [place] [address], [place] [address]. Todos los derechos reservados. Esta información no pretende sustituir la atención  médica profesional. Sólo lee médico puede diagnosticar y tratar un problema de reece.

## 2018-10-14 ENCOUNTER — APPOINTMENT (OUTPATIENT)
Dept: MRI IMAGING | Facility: CLINIC | Age: 48
DRG: 300 | End: 2018-10-14
Attending: EMERGENCY MEDICINE
Payer: COMMERCIAL

## 2018-10-14 ENCOUNTER — APPOINTMENT (OUTPATIENT)
Dept: MRI IMAGING | Facility: CLINIC | Age: 48
DRG: 300 | End: 2018-10-14
Attending: PSYCHIATRY & NEUROLOGY
Payer: COMMERCIAL

## 2018-10-14 ENCOUNTER — APPOINTMENT (OUTPATIENT)
Dept: PHYSICAL THERAPY | Facility: CLINIC | Age: 48
DRG: 300 | End: 2018-10-14
Attending: INTERNAL MEDICINE
Payer: COMMERCIAL

## 2018-10-14 LAB
ALBUMIN SERPL-MCNC: 3.3 G/DL (ref 3.4–5)
ALP SERPL-CCNC: 101 U/L (ref 40–150)
ALT SERPL W P-5'-P-CCNC: 48 U/L (ref 0–50)
ANION GAP SERPL CALCULATED.3IONS-SCNC: 4 MMOL/L (ref 3–14)
AST SERPL W P-5'-P-CCNC: 33 U/L (ref 0–45)
BILIRUB SERPL-MCNC: 0.3 MG/DL (ref 0.2–1.3)
BUN SERPL-MCNC: 11 MG/DL (ref 7–30)
CALCIUM SERPL-MCNC: 8 MG/DL (ref 8.5–10.1)
CHLORIDE SERPL-SCNC: 111 MMOL/L (ref 94–109)
CHOLEST SERPL-MCNC: 187 MG/DL
CO2 SERPL-SCNC: 28 MMOL/L (ref 20–32)
CREAT SERPL-MCNC: 0.64 MG/DL (ref 0.52–1.04)
CRP SERPL-MCNC: 4.4 MG/L (ref 0–8)
ERYTHROCYTE [SEDIMENTATION RATE] IN BLOOD BY WESTERGREN METHOD: 7 MM/H (ref 0–20)
GFR SERPL CREATININE-BSD FRML MDRD: >90 ML/MIN/1.7M2
GLUCOSE SERPL-MCNC: 94 MG/DL (ref 70–99)
HBA1C MFR BLD: 5.6 % (ref 0–5.6)
HDLC SERPL-MCNC: 39 MG/DL
LDLC SERPL CALC-MCNC: 116 MG/DL
NONHDLC SERPL-MCNC: 148 MG/DL
POTASSIUM SERPL-SCNC: 3.4 MMOL/L (ref 3.4–5.3)
PROT SERPL-MCNC: 6.6 G/DL (ref 6.8–8.8)
SODIUM SERPL-SCNC: 143 MMOL/L (ref 133–144)
TRIGL SERPL-MCNC: 158 MG/DL
TROPONIN I SERPL-MCNC: <0.015 UG/L (ref 0–0.04)
TROPONIN I SERPL-MCNC: <0.015 UG/L (ref 0–0.04)

## 2018-10-14 PROCEDURE — 83036 HEMOGLOBIN GLYCOSYLATED A1C: CPT | Performed by: INTERNAL MEDICINE

## 2018-10-14 PROCEDURE — 97161 PT EVAL LOW COMPLEX 20 MIN: CPT | Mod: GP

## 2018-10-14 PROCEDURE — 70553 MRI BRAIN STEM W/O & W/DYE: CPT

## 2018-10-14 PROCEDURE — 85652 RBC SED RATE AUTOMATED: CPT | Performed by: INTERNAL MEDICINE

## 2018-10-14 PROCEDURE — 40000193 ZZH STATISTIC PT WARD VISIT

## 2018-10-14 PROCEDURE — 70544 MR ANGIOGRAPHY HEAD W/O DYE: CPT

## 2018-10-14 PROCEDURE — 25000132 ZZH RX MED GY IP 250 OP 250 PS 637: Performed by: INTERNAL MEDICINE

## 2018-10-14 PROCEDURE — 80061 LIPID PANEL: CPT | Performed by: INTERNAL MEDICINE

## 2018-10-14 PROCEDURE — 80053 COMPREHEN METABOLIC PANEL: CPT | Performed by: INTERNAL MEDICINE

## 2018-10-14 PROCEDURE — 70549 MR ANGIOGRAPH NECK W/O&W/DYE: CPT

## 2018-10-14 PROCEDURE — 25500064 ZZH RX 255 OP 636: Performed by: INTERNAL MEDICINE

## 2018-10-14 PROCEDURE — 36415 COLL VENOUS BLD VENIPUNCTURE: CPT | Performed by: INTERNAL MEDICINE

## 2018-10-14 PROCEDURE — 99233 SBSQ HOSP IP/OBS HIGH 50: CPT | Performed by: INTERNAL MEDICINE

## 2018-10-14 PROCEDURE — 84484 ASSAY OF TROPONIN QUANT: CPT | Performed by: INTERNAL MEDICINE

## 2018-10-14 PROCEDURE — 86140 C-REACTIVE PROTEIN: CPT | Performed by: INTERNAL MEDICINE

## 2018-10-14 PROCEDURE — A9585 GADOBUTROL INJECTION: HCPCS | Performed by: INTERNAL MEDICINE

## 2018-10-14 PROCEDURE — 12000000 ZZH R&B MED SURG/OB

## 2018-10-14 PROCEDURE — 40000894 ZZH STATISTIC OT IP EVAL DEFER: Performed by: OCCUPATIONAL THERAPIST

## 2018-10-14 RX ORDER — POTASSIUM CHLORIDE 750 MG/1
10 TABLET, EXTENDED RELEASE ORAL 2 TIMES DAILY WITH MEALS
Status: DISCONTINUED | OUTPATIENT
Start: 2018-10-14 | End: 2018-10-15 | Stop reason: HOSPADM

## 2018-10-14 RX ORDER — ASPIRIN 325 MG
325 TABLET ORAL DAILY
Status: DISCONTINUED | OUTPATIENT
Start: 2018-10-14 | End: 2018-10-15 | Stop reason: HOSPADM

## 2018-10-14 RX ORDER — ASPIRIN 325 MG
325 TABLET ORAL DAILY
Status: DISCONTINUED | OUTPATIENT
Start: 2018-10-14 | End: 2018-10-14

## 2018-10-14 RX ORDER — ACETAMINOPHEN 500 MG
1000 TABLET ORAL EVERY 6 HOURS PRN
Status: DISCONTINUED | OUTPATIENT
Start: 2018-10-14 | End: 2018-10-15 | Stop reason: HOSPADM

## 2018-10-14 RX ORDER — ATORVASTATIN CALCIUM 40 MG/1
40 TABLET, FILM COATED ORAL EVERY EVENING
Status: DISCONTINUED | OUTPATIENT
Start: 2018-10-14 | End: 2018-10-15 | Stop reason: HOSPADM

## 2018-10-14 RX ORDER — GADOBUTROL 604.72 MG/ML
10 INJECTION INTRAVENOUS ONCE
Status: COMPLETED | OUTPATIENT
Start: 2018-10-14 | End: 2018-10-14

## 2018-10-14 RX ADMIN — SENNOSIDES AND DOCUSATE SODIUM 1 TABLET: 8.6; 5 TABLET ORAL at 08:38

## 2018-10-14 RX ADMIN — PANTOPRAZOLE SODIUM 40 MG: 40 TABLET, DELAYED RELEASE ORAL at 08:37

## 2018-10-14 RX ADMIN — ATORVASTATIN CALCIUM 40 MG: 40 TABLET, FILM COATED ORAL at 20:48

## 2018-10-14 RX ADMIN — OXYCODONE HYDROCHLORIDE 5 MG: 5 TABLET ORAL at 04:14

## 2018-10-14 RX ADMIN — LOSARTAN POTASSIUM 50 MG: 50 TABLET ORAL at 08:37

## 2018-10-14 RX ADMIN — GADOBUTROL 10 ML: 604.72 INJECTION INTRAVENOUS at 04:40

## 2018-10-14 RX ADMIN — OXYCODONE HYDROCHLORIDE 5 MG: 5 TABLET ORAL at 11:41

## 2018-10-14 RX ADMIN — ASPIRIN 325 MG ORAL TABLET 325 MG: 325 PILL ORAL at 17:41

## 2018-10-14 RX ADMIN — ACETAMINOPHEN 1000 MG: 500 TABLET, FILM COATED ORAL at 15:23

## 2018-10-14 RX ADMIN — POTASSIUM CHLORIDE 10 MEQ: 750 TABLET, EXTENDED RELEASE ORAL at 17:41

## 2018-10-14 ASSESSMENT — ACTIVITIES OF DAILY LIVING (ADL)
ADLS_ACUITY_SCORE: 7

## 2018-10-14 ASSESSMENT — PAIN DESCRIPTION - DESCRIPTORS: DESCRIPTORS: HEADACHE

## 2018-10-14 NOTE — PLAN OF CARE
Problem: Patient Care Overview  Goal: Plan of Care/Patient Progress Review  OT attempted.  Patient just left for MRI.      Discharge Planner OT   Patient plan for discharge: home  Current status: order received and chart reviewed.  In discussion with PT, no skilled OT needs identified.  Patient is at baseline with balance, coordination, activity, ADLs, transfers.    Barriers to return to prior living situation: defer to PT  Recommendations for discharge: defer to PT  Rationale for recommendations: no skilled OT needs identified.  Will complete orders.       Entered by: COURTNEY MORALEZ 10/14/2018 2:33 PM

## 2018-10-14 NOTE — CONSULTS
[facility]    Vascular Surgery Note    Date of Admission:  10/13/2018    Assessment & Plan   Mi Waller is a 47 year old female who was admitted on 10/13/2018. I was asked to see the patient for right vertebral artery dissection. 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 Patient has headaches, but no other acute ischemic symptoms. There is no role for any acute intervention (i.e. Lysis) at this time.   Patient should be started on antiplatelet or anti-coagulation for the vertebral artery dissection. We will defer to neurology for selection of agent.     Active Problems:    * No active hospital problems. *      Arnold Roland MD    Code Status    No Order    Reason for Consult   Vertebral artery dissection     History of Present Illness   Mi Waller is a 47 year old female who presents with occipital headaches that started 4 days ago. The pain has not gone away, so she came to the ED. She says she's never had headaches like these before. She denies any trauma to the neck or head area at that time. Patient denies any weakness, slurry speech and stroke symptoms.     Past Medical History   I have reviewed this patient's medical history and updated it with pertinent information if needed.   Past Medical History:   Diagnosis Date     Hypertension        Past Surgical History   I have reviewed this patient's surgical history and updated it with pertinent information if needed.  Past Surgical History:   Procedure Laterality Date     HYSTERECTOMY         Prior to Admission Medications   None     Allergies   Allergies   Allergen Reactions     Penicillins Itching       Physical Exam   Temp: 98.8  F (37.1  C) Temp src: Oral BP: 130/77 Pulse: 79   Resp: 18 SpO2: 97 % O2 Device: None (Room air)    Vital Signs with Ranges  Temp:  [98.8  F (37.1  C)] 98.8  F (37.1  C)  Pulse:  [70-79] 79  Resp:  [15-18] 18  BP: (127-163)/() 130/77  SpO2:  [93 %-98 %] 97 %  211  lbs 0 oz    PE:   General: NAD, awake and alert  HEENT: trachea midline, EOM intact, PERRLA  Chest: S1 S2 present, RRR, palpable bilateral femoral pulses  Lungs: no labored breathing, no wheezing   Abd: soft, NT, ND, No rebound or guarding.  Neuro: Motor and sensory grossly intact, FROM in all 4 extremities, no neuro deficits   Psych: pleasant and appropriate, normal affect   Skin: moist, intact with no wounds or rashes    Data   CBC RESULTS:   Recent Labs   Lab Test  04/25/16   0552   WBC  6.8   RBC  5.66*   HGB  16.7*   HCT  47.6*   MCV  84   MCH  29.5   MCHC  35.1   RDW  13.6   PLT  211     Last Comprehensive Metabolic Panel:  Sodium   Date Value Ref Range Status   04/25/2016 139 133 - 144 mmol/L Final     Potassium   Date Value Ref Range Status   04/25/2016 2.7 (L) 3.4 - 5.3 mmol/L Final     Chloride   Date Value Ref Range Status   04/25/2016 102 94 - 109 mmol/L Final     Carbon Dioxide   Date Value Ref Range Status   04/25/2016 28 20 - 32 mmol/L Final     Anion Gap   Date Value Ref Range Status   04/25/2016 9 3 - 14 mmol/L Final     Glucose   Date Value Ref Range Status   04/25/2016 124 (H) 70 - 99 mg/dL Final     Urea Nitrogen   Date Value Ref Range Status   04/25/2016 18 7 - 30 mg/dL Final     Creatinine   Date Value Ref Range Status   04/25/2016 0.60 0.52 - 1.04 mg/dL Final     GFR Estimate   Date Value Ref Range Status   10/13/2018 89 >60 mL/min/1.7m2 Final     Calcium   Date Value Ref Range Status   04/25/2016 8.6 8.5 - 10.1 mg/dL Final

## 2018-10-14 NOTE — PROGRESS NOTES
10/14/18 1100   Quick Adds   Type of Visit Initial PT Evaluation       Present yes   Language Grenadian   Living Environment   Lives With spouse   Living Arrangements house   Home Accessibility bed and bath on same level;stairs to enter home;stairs within home   Number of Stairs to Enter Home 2  (no rails)   Number of Stairs Within Home 10  (to the basement with rail)   Stair Railings at Home inside, present on right side   Transportation Available family or friend will provide   Living Environment Comment Pt lives with spouse in a 1SH with 2 MINA and no rails, pt has laundry downstairs which  does all the time.    Self-Care   Dominant Hand right   Usual Activity Tolerance good   Current Activity Tolerance good   Regular Exercise yes   Activity/Exercise Type walking   Exercise Amount/Frequency 20 mins;2 times/wk   Equipment Currently Used at Home none   Activity/Exercise/Self-Care Comment Pt was ind with all ADL's    Functional Level Prior   Ambulation 0-->independent   Transferring 0-->independent   Toileting 0-->independent   Bathing 0-->independent   Dressing 0-->independent   Eating 0-->independent   Communication 0-->understands/communicates without difficulty   Swallowing 0-->swallows foods/liquids without difficulty   Cognition 0 - no cognition issues reported   Fall history within last six months no   Which of the above functional risks had a recent onset or change? none   Prior Functional Level Comment Pt was ind with ambualtion without the use of any AD's    General Information   Onset of Illness/Injury or Date of Surgery - Date 10/13/18   Referring Physician Tamika Lai MD   Patient/Family Goals Statement Get the headache resolved.   Pertinent History of Current Problem (include personal factors and/or comorbidities that impact the POC) Pt admitted with severe headaches. MRI and CT : neg for acute findings. PMH includes: hypertension, hyperlipidemia, prediabetes.     Precautions/Limitations fall precautions   Weight-Bearing Status - LLE full weight-bearing   Weight-Bearing Status - RLE full weight-bearing   General Observations Pleasant and cooperative   General Info Comments Activity: Ambulate with assist   Cognitive Status Examination   Orientation orientation to person, place and time   Level of Consciousness alert   Follows Commands and Answers Questions 100% of the time   Personal Safety and Judgment intact   Memory intact   Pain Assessment   Patient Currently in Pain (pt c/o headaches. RN aware)   Range of Motion (ROM)   ROM Comment BLE:WFL   Strength   Strength Comments BLE: 4+/5 bilaterally   Bed Mobility   Bed Mobility Comments Supine>sit: ind    Transfer Skills   Transfer Comments STS at independent    Gait   Gait Comments Gait x 400 ft without AD's at independent. No LOB or SOB noted. Pt went up 10 steps without rail support and reciprocal steps with supervision, Pt decend 10 steps using 1 rail and Supervision, reciprocal steps. Pt also went up/down 3 steps without rail support and Supervision for safety.    Balance   Balance Comments Sitting: good, Standing: good   Sensory Examination   Sensory Perception no deficits were identified   Coordination   Coordination no deficits were identified   Coordination Comments Heel to shin; toe taps   Muscle Tone   Muscle Tone no deficits were identified   Clinical Impression   Criteria for Skilled Therapeutic Intervention evaluation only   Clinical Presentation Stable/Uncomplicated   Clinical Decision Making (Complexity) Low complexity   Anticipated Discharge Disposition Home   Risk & Benefits of therapy have been explained (Not Applicable)   Clinical Impression Comments Pt is at baseline with fucntional mobilty, with no strength, coordination or balance deficits. Pt does not require skilled PT intervnetions at this time. Defer to [facility] staff for ambulation in the hallway.    Total Evaluation Time   Total Evaluation Time  (Minutes) 20

## 2018-10-14 NOTE — PLAN OF CARE
Problem: Patient Care Overview  Goal: Plan of Care/Patient Progress Review  Discharge Planner PT   Patient plan for discharge: Home with spouse   Current status: PT eval completed and discharged. Pt lives with spouse in a 1Sh with 2 MINA and no rails. Pt was ind with all ADL's, gait and was working prior to admission. Pt is at ind with all aspects of bed mobility, transfers and gait x 500 ft. Pt when up a flight of stairs, reciprocal steps without the use of rails and down using 1 rails. Pt when up/down 3 steps without the use of any rails, at supervision. Pt does not have any balance, strength or coordination concerns requiring skilled PT interventions.   Barriers to return to prior living situation: None   Recommendations for discharge: None  Rationale for recommendations: Pt is at baseline with functional mobility and strength. Does not required skilled PT       Entered by: Corie Lai 10/14/2018 1:07 PM

## 2018-10-14 NOTE — ED NOTES
[facility]  ED Nurse Handoff Report    ED Chief complaint: Headache (Headache since Tuesday. Not relieved by ibuprofen. Has also had recent URI.)      ED Diagnosis:   Final diagnoses:   Vertebral artery dissection (H)       Code Status: Full Code    Allergies:   Allergies   Allergen Reactions     Penicillins Itching       Activity level - Baseline/Home:  Independent    Activity Level - Current:   Stand with Assist     Needed?: No    Isolation: No  Infection: Not Applicable  Bariatric?: No    Vital Signs:   Vitals:    10/13/18 2015 10/13/18 2030 10/13/18 2100 10/13/18 2115   BP:  130/77 125/87    Pulse:  79 74 86   Resp:  18     Temp:       TempSrc:       SpO2: 94% 97% 97% 96%   Weight:       Height:           Cardiac Rhythm: ,        Pain level: 0-10 Pain Scale: 4    Is this patient confused?: No   [place] - Suicide Severity Rating Scale Completed?  Yes  If yes, what color did the patient score?  White    Patient Report: Initial Complaint: Pt has had a headache since Tuesday. No relief with medication.   Focused Assessment: pt is overall healthy. Arrived to ED with headache. Medications were given and pt did have relief. After a while headache came back. Pt states that the pain comes and goes. No other neurological deficits noted.   Tests Performed: CT head, CTA head, MRI screening form is done and pt will be going to that as soon as MRI is ready.   Abnormal Results: nonocclusive dissection of right vertebral artery V4.   Treatments provided: 1L NS, 4mg morphine, 0.5mg Ativan, 25mg Bendaryl, 325mg ASA.      Family Comments:  is at bedside. Both the patient and  speak Andorran but speak English as well.     OBS brochure/video discussed/provided to patient/family: N/A              Name of person given brochure if not patient: n/a              Relationship to patient: n/a    ED Medications:   Medications   0.9% sodium chloride BOLUS (0 mLs Intravenous Stopped 10/13/18 2000)      Followed by   sodium chloride 0.9% infusion (not administered)   metoclopramide (REGLAN) injection 10 mg (10 mg Intravenous Given 10/13/18 1622)   diphenhydrAMINE (BENADRYL) injection 25 mg (25 mg Intravenous Given 10/13/18 1625)   LORazepam (ATIVAN) injection 0.5 mg (0.5 mg Intravenous Given 10/13/18 1745)   100mL saline flush (90 mLs Intravenous Given 10/13/18 1911)   iopamidol (ISOVUE-370) solution 70 mL (70 mLs Intravenous Given 10/13/18 1910)   morphine (PF) injection 4 mg (4 mg Intravenous Given 10/13/18 2102)   aspirin tablet 325 mg (325 mg Oral Given 10/13/18 2115)       Drips infusing?:  No    For the majority of the shift this patient was Green.   Interventions performed were monitor, meds, reposition, updated on plan.    Severe Sepsis OR Septic Shock Diagnosis Present: No    To be done/followed up on inpatient unit:  ER physician stated that patient can either go to MRI or floor, which ever is ready first. I will call you if patient comes to you before MRI.      ED NURSE PHONE NUMBER: *[phone]

## 2018-10-14 NOTE — PROGRESS NOTES
October 14, 2018  3:55PM    Regarding Mi Waller  YOB: 1970    To Whom It May Concern:    Please be advised that Ms. Mi Waller is my patient and has been hospitalized since yesterday.  Due to being ill, neither she nor her  have been able to work. I am hopeful that she will be able to be discharged from the hospital as early as October 15, 2018 with return to work to be determined after that.    Thank you for your understanding in this manner    Torsten Hudson M.D.

## 2018-10-14 NOTE — ED NOTES
Assessed patient at this time. States that her headache went away after medications, but has came back. Rates 4/10.

## 2018-10-14 NOTE — H&P
[facility]    History and Physical  Hospitalist       Date of Admission:  10/13/2018    Assessment & Plan   Mi Waller is a 47 year old female with a history of hypertension, hyperlipidemia, prediabetes presents with a 4-day history of headache.   Principal Problem:    Vertebral artery dissection (H) right     Headache  started 4 days ago with the sudden neck movement following brushing her teeth.  Currently no neurologic abnormalities identified except some tingling and numbness on the face and right upper extremity.    Monitor blood pressure closely, admit to inpatient neuro telemetry    Vascular surgery and neurology consulted, notified in ER    Recommendations from neurology for continuing full dose aspirin, MRI and MRA pending.    Continue symptom control, mainly headache.    PT/OT to evaluate patient when okay by neurology.  As needed BP meds ordered.    Benign essential hypertension    Will continue her home dose of losartan    Hyperlipidemia LDL goal <100    Diet controlled, will repeat the lipid panel in a.m.    Prediabetes    We will monitor the blood sugars    Hemoglobin A1c ordered.    DVT Prophylaxis: Pneumatic Compression Devices  Code Status: Full Code    Disposition: Expected discharge in 2-3 days     Tamika Lai MD    Primary Care Physician   Nishi Anders V    Chief Complaint   Headache 4 days     History is obtained from the patient  And medical records     History of Present Illness   Mi Waller is a 47 year old female who barely speaks English, presents to the emergency department with complaints of headache lasting for more than 4 days.  4 days ago she woke up around 4:30 and was brushing her teeth  and she turned her head and suddenly developed severe headache.  She felt as if someone had hit her.  She was taking ibuprofen at home for relief of the pain, which was a temporary relief and she had to take multiple doses of ibuprofen to feel better.  Because the pain was  not getting any better he has she is a new person, she presented to the emergency department. the pain is more throbbing in character and she feels that it radiates to her neck on the right side some radiation to the shoulder noted.  She is mentioning some tingling and numbness on the right side of  the face and upper extremity. no loss of power noted.  No fever no chills no recent travel abroad.    In the emergency department and LP was attempted on her which was unsuccessful.  CT head was negative but a CTA indicated possible right-sided vertebral artery dissection.  ER physician did contact the neurology and they suggested continuing full dose aspirin and obtaining MRI and MRA.  Vascular surgery had indicated that they would come visit with the patient tomorrow a.m. her creatinine is 0.7.  No similar episodes in the past and no history of any CVA in the past.  She has history of hypertension and she takes losartan for that.   Past Medical History    I have reviewed this patient's medical history and updated it with pertinent information if needed.   Past Medical History:   Diagnosis Date     Hypertension        Past Surgical History   I have reviewed this patient's surgical history and updated it with pertinent information if needed.  Past Surgical History:   Procedure Laterality Date     HYSTERECTOMY         Prior to Admission Medications   Prior to Admission Medications   Prescriptions Last Dose Informant Patient Reported? Taking?   ibuprofen (ADVIL/MOTRIN) 600 MG tablet 10/13/2018 at PRN Self Yes Yes   Sig: Take 600 mg by mouth every 8 hours as needed for moderate pain   losartan (COZAAR) 25 MG tablet 10/13/2018 at AM Self Yes Yes   Sig: Take 25 mg by mouth daily      Facility-Administered Medications: None     Allergies   Allergies   Allergen Reactions     Penicillins Itching       Social History   I have reviewed this patient's social history and updated it with pertinent information if needed. Mi BROOKS  Sridhar  reports that she has never smoked. She has never used smokeless tobacco. She reports that she does not drink alcohol or use illicit drugs.    Family History   I have reviewed this patient's family history and updated it with pertinent information if needed.   No family history on file.    Review of Systems   The 10 point Review of Systems is negative other than noted in the HPI or here.     Physical Exam   Temp: 98.3  F (36.8  C) Temp src: Oral BP: (!) 154/93 Pulse: 71   Resp: 14 SpO2: 95 % O2 Device: None (Room air)    Vital Signs with Ranges  Temp:  [98.3  F (36.8  C)-98.8  F (37.1  C)] 98.3  F (36.8  C)  Pulse:  [69-86] 71  Resp:  [14-18] 14  BP: (125-163)/() 154/93  SpO2:  [93 %-98 %] 95 %  211 lbs 0 oz    Constitutional: Awake, alert, cooperative, no apparent distress.  Eyes: Conjunctiva and pupils examined and normal.  HEENT: Moist mucous membranes, normal dentition.  Respiratory: Clear to auscultation bilaterally, no crackles or wheezing.  Cardiovascular: Regular rate and rhythm, normal S1 and S2, and no murmur noted.  GI: Soft, non-distended, non-tender, normal bowel sounds.  Lymph/Hematologic: No anterior cervical or supraclavicular adenopathy.  Skin: No rashes, no cyanosis, no edema.  Musculoskeletal: No joint swelling, erythema or tenderness.  Neurologic: Cranial nerves 2-12 intact, normal strength and sensation.  Psychiatric: Alert, oriented to person, place and time, no obvious anxiety or depression.    Data   Data reviewed today:  I personally reviewed .  No lab results found in last 7 days.    Imaging:  Recent Results (from the past 24 hour(s))   CT Head w/o Contrast    Narrative    CT SCAN OF THE HEAD WITHOUT CONTRAST   10/13/2018 4:38 PM     HISTORY: Sudden onset headache, new, evaluate for ICH, mass effect,  other.    TECHNIQUE:  Axial images of the head and coronal reformations without  IV contrast material. Radiation dose for this scan was reduced using  automated exposure control,  adjustment of the mA and/or kV according  to patient size, or iterative reconstruction technique.    COMPARISON: None.    FINDINGS:  Mild volume loss is present. White matter hypoattenuation  likely represents chronic small vessel ischemic change commensurate  with age. No evidence of acute ischemia, hemorrhage, mass, mass  effect, or hydrocephalus. The visualized calvarium, skull base,  paranasal sinuses, and extracranial soft tissues are unremarkable.      Impression    IMPRESSION: No acute intracranial abnormality.       MADDIE COTE MD   CTA Head with Contrast    Narrative    CT ANGIOGRAM OF THE HEAD AND NECK WITH CONTRAST  10/13/2018 7:17 PM     HISTORY: Evaluate for aneurysm, sudden onset headache, difficult LP.        TECHNIQUE:  CT angiography with an injection of 70 mL Isovue-370 IV  with scans through the head and neck. Images were transferred to a  separate 3-D workstation where multiplanar reformations and 3-D images  were created. Estimates of carotid stenoses are made relative to the  distal internal carotid artery diameters except as noted. Radiation  dose for this scan was reduced using automated exposure control,  adjustment of the mA and/or kV according to patient size, or iterative  reconstruction technique.    COMPARISON: Same day head CT.     CT HEAD FINDINGS: No arterial phase enhancing lesions are identified.    CT ANGIOGRAM HEAD FINDINGS:    There is moderate to severe narrowing of the right vertebral artery V4  segment. Small suspected false lumen is present. The involved segment  includes the posterior inferior cerebellar artery origin. There is  slightly asymmetrically decreased contrast opacification of the right  posterior inferior cerebellar artery compared to the left. Left  vertebral artery and basilar artery are patent. The internal carotid  arteries, anterior cerebral arteries, middle cerebral arteries are  patent. No aneurysms are identified. Dural venous sinuses  are  unremarkable. Left transverse sinus is dominant.      Impression    IMPRESSION:  Moderate to severe narrowing of the mid right vertebral  artery V4 segment over a 1 cm segment, age indeterminate. Differential  diagnosis includes nonocclusive dissection (favored), atypical  atherosclerotic plaquing (less likely). The involved segment contains  the PICA origin. There is slightly asymmetrically decreased contrast  opacification of the right PICA compared to the left which could be  physiologic, however acute narrowing cannot be excluded. This could be  further evaluated with angiography if clinically indicated.      MADDIE COTE MD

## 2018-10-14 NOTE — PROGRESS NOTES
"[facility]    Hospitalist Progress Note    Assessment & Plan   Mi Waller is a 47 year old female who was admitted on 10/13/2018. With a headache that would not stop    Problem 1: Headache, presumed to be due to Right distal vertebral artery stenosis and suggestion of an intramural hematoma surround that artery.  Working diagnosis is a vertebral artery disection  - Headache is improved  - No neurologic deficits or other symptoms  -     Problem 2: Hypertension, initially high, but much better and continuing her PTA Losartan  -   -   -     Problem 3: Hyperlipidemia, not previously treated, goal LDL should be <71, will start Atorvastatin for that    - 4 Obesity, not diabetic but some concerns about \"pre diabetes\".  Diet and exercise and life style alteration is best for that.  Blood glucose here has been good. Will add a consistent carbohydrate diet and get a dietician consult    -  5: Hypokalemia, will replete  -     # Pain Assessment:  Current Pain Score 10/14/2018   Patient currently in pain? sleeping: patient not able to self report   Pain score (0-10) -   Pain location -   Pain descriptors -   - Mi is experiencing pain due to headaches. Pain management was discussed and the plan was created in a collaborative fashion.  Mi's response to the current recommendations: engaged  - Opioid regimen: Oxycodone  - Response to opioid medications: Reduction of symptoms to a very good degree  - Bowel regimen: not needed        DVT Prophylaxis: Pneumatic Compression Devices  Code Status: Full Code    Disposition: Expected discharge in 1 days once MRI report is back and she is staable.    Mauricio Hudson MD    Interval History   Mrs. Waller is better, The headache comes back after the medicine wears off, but much less. No chest pain, or abdominal pain. Bowels and bladder ok, No nausea or vomiting at no time did she have diplopia, amaurosis fugax, localizing weakness or incoordination or dysphasia.  No " confusion, previous headaches are much less and no go away with APAP. No flashing lights or other scotoma.     -Data reviewed today: I reviewed all new labs and imaging results over the last 24 hours. I personally reviewed no images or EKG's today.    Physical Exam   Temp: 98.6  F (37  C) Temp src: Oral BP: 129/78 Pulse: 68   Resp: 16 SpO2: 100 % O2 Device: None (Room air)    Vitals:    10/13/18 1440 10/14/18 0507   Weight: 95.7 kg (211 lb) 95.9 kg (211 lb 6.7 oz)     Vital Signs with Ranges  Temp:  [98  F (36.7  C)-98.6  F (37  C)] 98.6  F (37  C)  Pulse:  [60-86] 68  Resp:  [14-18] 16  BP: (119-154)/(65-93) 129/78  SpO2:  [93 %-100 %] 100 %  I/O last 3 completed shifts:  In: 850 [I.V.:850]  Out: -     Constitutional: Alert, cooperative  Respiratory: Clear to A&P  Cardiovascular: Regular rhythm, normal S1 and S2, no murmurs or S3, no edema no carotid bruits  GI: not tender, bowel sounds are normal  Skin/Integumen: no rashes  Other:      Medications     - MEDICATION INSTRUCTIONS -         influenza vaccine adult (product based on age)  0.5 mL Intramuscular Prior to discharge     losartan  50 mg Oral Daily     pantoprazole  40 mg Oral Daily     senna-docusate  1 tablet Oral BID    Or     senna-docusate  2 tablet Oral BID     sodium chloride (PF)  3 mL Intracatheter Q8H       Data     Recent Labs  Lab 10/14/18  1428 10/14/18  0550 10/13/18  2240   NA  --  143  --    POTASSIUM  --  3.4  --    CHLORIDE  --  111*  --    CO2  --  28  --    BUN  --  11  --    CR  --  0.64  --    ANIONGAP  --  4  --    JEN  --  8.0*  --    GLC  --  94  --    ALBUMIN  --  3.3*  --    PROTTOTAL  --  6.6*  --    BILITOTAL  --  0.3  --    ALKPHOS  --  101  --    ALT  --  48  --    AST  --  33  --    TROPI <0.015 <0.015 <0.015       Imaging:   Recent Results (from the past 24 hour(s))   CT Head w/o Contrast    Narrative    CT SCAN OF THE HEAD WITHOUT CONTRAST   10/13/2018 4:38 PM     HISTORY: Sudden onset headache, new, evaluate for ICH, mass  effect,  other.    TECHNIQUE:  Axial images of the head and coronal reformations without  IV contrast material. Radiation dose for this scan was reduced using  automated exposure control, adjustment of the mA and/or kV according  to patient size, or iterative reconstruction technique.    COMPARISON: None.    FINDINGS:  Mild volume loss is present. White matter hypoattenuation  likely represents chronic small vessel ischemic change commensurate  with age. No evidence of acute ischemia, hemorrhage, mass, mass  effect, or hydrocephalus. The visualized calvarium, skull base,  paranasal sinuses, and extracranial soft tissues are unremarkable.      Impression    IMPRESSION: No acute intracranial abnormality.       MADDIE COTE MD   CTA Head with Contrast    Narrative    CT ANGIOGRAM OF THE HEAD AND NECK WITH CONTRAST  10/13/2018 7:17 PM     HISTORY: Evaluate for aneurysm, sudden onset headache, difficult LP.        TECHNIQUE:  CT angiography with an injection of 70 mL [id number]-370 IV  with scans through the head and neck. Images were transferred to a  separate 3-D workstation where multiplanar reformations and 3-D images  were created. Estimates of carotid stenoses are made relative to the  distal internal carotid artery diameters except as noted. Radiation  dose for this scan was reduced using automated exposure control,  adjustment of the mA and/or kV according to patient size, or iterative  reconstruction technique.    COMPARISON: Same day head CT.     CT HEAD FINDINGS: No arterial phase enhancing lesions are identified.    CT ANGIOGRAM HEAD FINDINGS:    There is moderate to severe narrowing of the right vertebral artery V4  segment. Small suspected false lumen is present. The involved segment  includes the posterior inferior cerebellar artery origin. There is  slightly asymmetrically decreased contrast opacification of the right  posterior inferior cerebellar artery compared to the left. Left  vertebral artery and  basilar artery are patent. The internal carotid  arteries, anterior cerebral arteries, middle cerebral arteries are  patent. No aneurysms are identified. Dural venous sinuses are  unremarkable. Left transverse sinus is dominant.      Impression    IMPRESSION:  Moderate to severe narrowing of the mid right vertebral  artery V4 segment over a 1 cm segment, age indeterminate. Differential  diagnosis includes nonocclusive dissection (favored), atypical  atherosclerotic plaquing (less likely). The involved segment contains  the PICA origin. There is slightly asymmetrically decreased contrast  opacification of the right PICA compared to the left which could be  physiologic, however acute narrowing cannot be excluded. This could be  further evaluated with angiography if clinically indicated.      MADDIE COTE MD   MR Brain w/o & w Contrast    Narrative    MRI BRAIN WITHOUT AND WITH CONTRAST  10/14/2018 4:39 AM    HISTORY:  eval CVA, vert artery dissection, RUE weakness;      TECHNIQUE:  Multiplanar, multisequence MRI of the brain without and  with 10 mL Gadavist    COMPARISON: None.    FINDINGS:  The brain parenchyma, ventricles and subarachnoid spaces  appear normal. There is no evidence of hemorrhage, mass, acute  infarct, or anomaly.  There are no gadolinium enhancing lesions.   The  facial structures appear normal. The arteries at the base of the brain  and the dural venous sinuses appear patent.       Impression    IMPRESSION:  No acute pathology. No bleed, mass, or acute infarcts are  seen.    I agree with the preliminary report that was communicated to the  referring physician.      ALEN SOTO MD   MRA Neck (Carotids) wo & w Contrast    Narrative    MRA NECK (CAROTIDS) WO & W CONTRAST 10/14/2018 4:40 AM     HISTORY:  R posterior HA, likely R vert dissection;     TECHNIQUE:  Sequential axial images of the neck were obtained using  2-dimensional time-of-flight before contrast and 3-dimensional  time-of-flight after  the uneventful administration of 10 mL Gadavist  iv contrast.    COMPARISON:  None.    FINDINGS: The postcontrast MR angiogram it is of relatively poor  quality, there is significant venous contamination which partially  obscures some of the neck vessels. Stenosis is relative to the distal  internal carotid diameter.    Right Carotid:  No significant stenosis is seen at the bifurcation  relative to the distal internal carotid diameter.    Left Carotid:  No significant stenosis is seen at the bifurcation  relative to the distal internal carotid diameter.    Vertebrals: Antegrade flow is seen in both vertebral arteries. The  right vertebral artery is relatively small. The left vertebral artery  is dominant. The V4 segment of the right distal vertebral artery is  not well seen on this study.    No arterial dissection is identified.      Impression    IMPRESSION: Unremarkable study. The vessels of the neck are patent.  The distal, V4 segment of the right vertebral artery is not well seen  on this study.    I agree with the preliminary report that was communicated to the  referring physician.    ALEN SOTO MD   MRA Brain ([place] [facility] [place]) wo Contrast    Narrative    MR ANGIOGRAM OF THE HEAD WITHOUT CONTRAST   10/14/2018 3:29 PM     HISTORY: Right vertebral V4 disease to rule out dissection versus  atherosclerosis.      TECHNIQUE:  3D time-of-flight MR angiogram of the head without  contrast.    COMPARISON: CT angiogram dated 10/13/2018.    FINDINGS:  There is a stenosis in the V4 segment of the distal right  vertebral artery. This corresponds to the findings seen on the CT  angiogram of 10/14/2018. On the source images from the MR angiogram  there is slightly increased signal around the vertebral artery, just  proximal to the origin of the right posterior inferior cerebellar  artery.  This is seen on source images 49-57 of series 5. On the  fat-saturated T1-weighted images, there is also the suggestion of  intramural  hematoma surrounding the distal vertebral artery. This is  seen on axial image 9 of series 9.      Impression    IMPRESSION:  Moderate-severe stenosis in the V4 segment of the distal  right vertebral artery. There appears to be high signal surrounding  the vessel on the source images which would suggest an intramural  hematoma. These findings would be most compatible with an arterial  dissection.      ALEN SOTO MD

## 2018-10-14 NOTE — PLAN OF CARE
Problem: Patient Care Overview  Goal: Plan of Care/Patient Progress Review  Outcome: No Change  A&O x4. Neuros intact except for headache. VSS. Tele NSR. NPO except for meds. Has LP site on lower back from another facility per family. Up with SBA. Prn oxycodone given for headache. MRI/MRA completed overnight, plan for neuro consult.

## 2018-10-14 NOTE — CONSULTS
"[facility]    Patient Name:  Mi Waller  MRN:  [id number]    :  1970    Date of Admission:  10/13/2018  Date of Service:  2018    Chief Complaint/Consult Question: Vertebral dissection     HPI:  Mi Waller is an 47 year old lady with hypertension and a childhood stroke causing transient complete vision loss who presented after several days of a severe headache.     The headache started suddenly and it was severe. It took minutes to reach peak pain. The pain is localized to the right occipital area radiating to right frontal area, right neck and right shoulder. The pain over there right shoulder is also a burning sensation. The headaches are pulsating and sharp. The headaches fluctuate and improve with ibuprofen, but the overall persistence of the pain concerned the patient (and thus presented for evaluation). She describes occasional episodes of retropulsion without falls since the headache started. The right upper extremity feels somewhat weak and \"falls asleep\" easily. She also describes occasional blurred vision since the headache started. Denies recent trauma or chiropractic manipulations. She does not smoke. 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 Lives with . Does not take birth control pills. Denies drug use. Denies prior blood clots or miscarriages.     CT scan of the head was negative for acute findings. MRI of the brain was negative for acute findings.     CTA of the head and neck: moderate to severe narrowing of the mid right vertebral artery V4 segment over a 1 cm segment, age indeterminate; differential diagnosis includes nonocclusive dissection (favored), atypical atherosclerotic plaquing (less likely); the involved segment contains the PICA origin; there is slightly asymmetrically decreased contrast opacification of the right PICA compared to the left which could be physiologic, however acute narrowing cannot be excluded.     She has " no history of migraines but does suffer from occasional headaches. Her usual headaches have not changed in the recent months and have not been particularly bothersome until the new headache developed a few days ago.  She denies recent weight loss, in fact she has been gaining weight.     24 Hour Vital Signs Summary:  Temperatures:  Current - Temp: 98  F (36.7  C); Max - Temp  Av.3  F (36.8  C)  Min: 98  F (36.7  C)  Max: 98.8  F (37.1  C)  Respiration range: Resp  Av.5  Min: 14  Max: 18  Pulse range: Pulse  Av.4  Min: 60  Max: 86  Blood pressure range: Systolic (24hrs), Av , Min:119 , Max:163   ; Diastolic (24hrs), Av, Min:65, Max:100    Pulse oximetry range: SpO2  Av.6 %  Min: 93 %  Max: 98 %    Physical Examination:  Physical Examination:  General:  Patient lying in bed without any acute distress    HEENT:  Normocephalic/atraumatic  Cardio:  Regular rate and rhythm   Pulmonary:  No respiratory distress  Extremities:  No edema  Skin:  Warm/dry     Neurologic Examination:  Mental Status: Alert and oriented.   Language: Speaks in full sentences. Able to name and repeat. Follows commands.   Speech: No dysarthria.   Cranial Nerves: Visual fields are full to confrontation. Pupils are symmetric and briskly reactive to light. Gaze is conjugate. Extraocular movements are intact. Face is symmetric with normal eye closure and smile.   Motor: Strength normal and symmetrical in upper and lower extremities.  Sensory: Normal and symmetric to soft touch in the upper and lower extremities.   Cerebellar: No dysmetria on finger-to-nose and heel-knee-shin.     ROS: Ten point review of systems is negative other than noted in the HPI     Past Medical History:   I have reviewed this patient's past medical history.    Family History:  Family history reviewed.    Social History:  Social history reviewed.     Home Medications:  Prescriptions Prior to Admission   Medication Sig Dispense Refill Last Dose      ibuprofen (ADVIL/MOTRIN) 600 MG tablet Take 600 mg by mouth every 8 hours as needed for moderate pain   10/13/2018 at PRN     losartan (COZAAR) 50 MG tablet Take 50 mg by mouth daily   10/13/2018 at AM       Current Medications:  Current Facility-Administered Medications   Medication     bisacodyl (DULCOLAX) Suppository 10 mg     influenza quadrivalent (PF) vacc (FLUZONE or Flulaval or FLUARIX) injection 0.5 mL     labetalol (NORMODYNE/TRANDATE) injection 10 mg     lidocaine (LMX4) cream     lidocaine 1 % 1 mL     losartan (COZAAR) tablet 50 mg     magnesium hydroxide (MILK OF MAGNESIA) suspension 30 mL     Medication Instruction     metoclopramide (REGLAN) tablet 10 mg    Or     metoclopramide (REGLAN) injection 10 mg     naloxone (NARCAN) injection 0.1-0.4 mg     ondansetron (ZOFRAN-ODT) ODT tab 4 mg    Or     ondansetron (ZOFRAN) injection 4 mg     oxyCODONE IR (ROXICODONE) tablet 5-10 mg     pantoprazole (PROTONIX) EC tablet 40 mg     prochlorperazine (COMPAZINE) injection 10 mg    Or     prochlorperazine (COMPAZINE) tablet 10 mg    Or     prochlorperazine (COMPAZINE) Suppository 25 mg     senna-docusate (SENOKOT-S;PERICOLACE) 8.6-50 MG per tablet 1 tablet    Or     senna-docusate (SENOKOT-S;PERICOLACE) 8.6-50 MG per tablet 2 tablet     sodium chloride (PF) 0.9% PF flush 3 mL     sodium chloride (PF) 0.9% PF flush 3 mL       Allergies:   Allergies   Allergen Reactions     Penicillins Itching       Labs/Studies:  Recent Labs   Lab Test  10/14/18   0550  04/25/16   0552   NA  143  139   POTASSIUM  3.4  2.7*   CHLORIDE  111*  102   CO2  28  28   ANIONGAP  4  9   GLC  94  124*   BUN  11  18   CR  0.64  0.60   JEN  8.0*  8.6   WBC   --   6.8   RBC   --   5.66*   HGB   --   16.7*   PLT   --   211       No lab results found in last 7 days.      Recent Labs  Lab 10/14/18  0550   GLC 94           Assessment/Plan:  #1 Thunderclap headache  #2 Moderate to severe narrowing of the mid right vertebral artery V4 segment,  suspect underlying dissection   #3 Transient ischemic attack secondary to #2, manifesting as right-sided numbness    Ms. Waller presents after a thunderclap headache and intermittent right upper extremity numbness and sensation of retropulsion when walking. CT scan of the head shows narrowing of the right vertebral artery suspicious for an underlying dissection. Overall, the patient's presentation and imaging are most suspicious for a vertebral artery dissection. We would like to confirm this with an MRA of the head. Regardless, given the history of possible childhood stroke and this new vertebral artery dissection, lifelong antiplatelet treatment is recommended. Dual antiplatelet therapy is not considered due to the lack of evidence of intracranial atherosclerosis.     RECOMMENDATIONS:  1. MRA head to look for dissection looking at fat saturation sequences   2. Continue aspirin 325 mg daily--she will likely require lifelong aspirin treatment (to be determined at stroke clinic follow up)  3. After the MRA is completed and patient passes PT/OT evaluations she can be discharged   4. Follow in stroke clinic in 3 months with repeat vessel imaging     Thank you for the interesting consult. Please contact our service with any questions or concerns.

## 2018-10-14 NOTE — PLAN OF CARE
[id number]: Alert and oriented x4. VSS. Pain controled with tylenol. Neuros intact. Tele NSR. Tolerating diet. Plan to discharge tomorrow. Will continue to monitor.

## 2018-10-14 NOTE — PHARMACY-ADMISSION MEDICATION HISTORY
Admission medication history interview status for the 10/13/2018  admission is complete. See EPIC admission navigator for prior to admission medications     Medication history source reliability: moderate    Actions taken by pharmacist (provider contacted, etc): None    Additional medication history information not noted on PTA med list :  -PTA interview under 's assistance.     Medication reconciliation/reorder completed by provider prior to medication history? No    Time spent in this activity:  5 minutes    Prior to Admission medications    Medication Sig Last Dose Taking? Auth Provider   ibuprofen (ADVIL/MOTRIN) 600 MG tablet Take 600 mg by mouth every 8 hours as needed for moderate pain 10/13/2018 at PRN Yes Unknown, Entered By History   losartan (COZAAR) 50 MG tablet Take 50 mg by mouth daily 10/13/2018 at AM Yes Unknown, Entered By History       Vickie Gilbert, Pharm.D IV Student

## 2018-10-14 NOTE — PLAN OF CARE
"Problem: Patient Care Overview  Goal: Plan of Care/Patient Progress Review  Outcome: No Change  Admitted this evening for a vertebral artery dissection. A&0x4, VSS--hypertensive upon arrival to the floor, night shift will recheck shortly. Reporting slightly dull headache but declines intervention at this time. Neuros otherwise intact (patient did report some numbness in her right hand and right side of her face \"feeling funny\" when the headache was most severe but not having these symptoms currently.) Up with SBA. Brother and spouse at the bedside- requesting  for tomorrow. MRI/MRA ordered. Neurology recommending aspirin (given in ER). Discharge plan pending.       "

## 2018-10-15 VITALS
BODY MASS INDEX: 40.6 KG/M2 | WEIGHT: 206.79 LBS | OXYGEN SATURATION: 96 % | HEART RATE: 67 BPM | TEMPERATURE: 98.2 F | HEIGHT: 60 IN | SYSTOLIC BLOOD PRESSURE: 135 MMHG | DIASTOLIC BLOOD PRESSURE: 81 MMHG | RESPIRATION RATE: 16 BRPM

## 2018-10-15 LAB — POTASSIUM SERPL-SCNC: 4 MMOL/L (ref 3.4–5.3)

## 2018-10-15 PROCEDURE — 25000132 ZZH RX MED GY IP 250 OP 250 PS 637: Performed by: INTERNAL MEDICINE

## 2018-10-15 PROCEDURE — 84132 ASSAY OF SERUM POTASSIUM: CPT | Performed by: INTERNAL MEDICINE

## 2018-10-15 PROCEDURE — 25000128 H RX IP 250 OP 636: Performed by: INTERNAL MEDICINE

## 2018-10-15 PROCEDURE — 90686 IIV4 VACC NO PRSV 0.5 ML IM: CPT | Performed by: INTERNAL MEDICINE

## 2018-10-15 PROCEDURE — 99238 HOSP IP/OBS DSCHRG MGMT 30/<: CPT | Performed by: INTERNAL MEDICINE

## 2018-10-15 PROCEDURE — 36415 COLL VENOUS BLD VENIPUNCTURE: CPT | Performed by: INTERNAL MEDICINE

## 2018-10-15 RX ORDER — ASPIRIN 325 MG
325 TABLET ORAL DAILY
Qty: 180 TABLET
Start: 2018-10-16

## 2018-10-15 RX ORDER — ATORVASTATIN CALCIUM 40 MG/1
40 TABLET, FILM COATED ORAL EVERY EVENING
Qty: 30 TABLET | Refills: 0 | Status: SHIPPED | OUTPATIENT
Start: 2018-10-15

## 2018-10-15 RX ADMIN — ACETAMINOPHEN 1000 MG: 500 TABLET, FILM COATED ORAL at 01:34

## 2018-10-15 RX ADMIN — PANTOPRAZOLE SODIUM 40 MG: 40 TABLET, DELAYED RELEASE ORAL at 09:44

## 2018-10-15 RX ADMIN — ASPIRIN 325 MG ORAL TABLET 325 MG: 325 PILL ORAL at 09:45

## 2018-10-15 RX ADMIN — INFLUENZA A VIRUS A/MICHIGAN/45/2015 X-275 (H1N1) ANTIGEN (FORMALDEHYDE INACTIVATED), INFLUENZA A VIRUS A/SINGAPORE/INFIMH-16-0019/2016 IVR-186 (H3N2) ANTIGEN (FORMALDEHYDE INACTIVATED), INFLUENZA B VIRUS B/PHUKET/3073/2013 ANTIGEN (FORMALDEHYDE INACTIVATED), AND INFLUENZA B VIRUS B/MARYLAND/15/2016 BX-69A ANTIGEN (FORMALDEHYDE INACTIVATED) 0.5 ML: 15; 15; 15; 15 INJECTION, SUSPENSION INTRAMUSCULAR at 10:06

## 2018-10-15 RX ADMIN — ACETAMINOPHEN 1000 MG: 500 TABLET, FILM COATED ORAL at 09:45

## 2018-10-15 RX ADMIN — SENNOSIDES AND DOCUSATE SODIUM 1 TABLET: 8.6; 5 TABLET ORAL at 09:46

## 2018-10-15 RX ADMIN — LOSARTAN POTASSIUM 50 MG: 50 TABLET ORAL at 09:45

## 2018-10-15 RX ADMIN — POTASSIUM CHLORIDE 10 MEQ: 750 TABLET, EXTENDED RELEASE ORAL at 09:45

## 2018-10-15 ASSESSMENT — ACTIVITIES OF DAILY LIVING (ADL)
ADLS_ACUITY_SCORE: 7

## 2018-10-15 NOTE — DISCHARGE INSTRUCTIONS
Your risk factors for stroke or TIA (transient ischemic attack):    Your Risk Factors Your Results Normal Ranges   High blood pressure BP Readings from Last 1 Encounters:   10/15/18 135/81    Less than 120/80   Cholesterol              Total Lab Results   Component Value Date    CHOL 187 10/14/2018      Less than 150    Triglycerides   Lab Results   Component Value Date    TRIG 158 10/14/2018    Less than 150   LDL Lab Results   Component Value Date     10/14/2018       Less than 70   HDL Lab Results   Component Value Date    HDL 39 10/14/2018            Greater than 40 (men)  Greater than 50 (women)   Diabetes   Recent Labs  Lab 10/14/18  0550   GLC 94    Fasting blood glucose    Smoking/tobacco use  Quit smoking and tobacco   Overweight  Lose 1-2 pounds a week   Lack of exercise  30 minutes moderate activity each day   Other risk factors include carotid (neck) artery disease, atrial fibrillation and stress. You may be on new medicine to treat high blood pressure, cholesterol, diabetes or atrial fibrillation.    Understanding Stroke Booklet given to patient. Please refer to booklet for further information.    Stroke warning signs and symptoms - CALL 911 right away for:  - Sudden numbness or weakness in the face, arm or leg (often on one side of the body).  - Sudden confusion or trouble understanding what is going on.  - Sudden blurred or decreased vision in one or both eyes.  - Sudden trouble speaking, loss of balance, dizziness or problems with coordination.  - Sudden, severe headache for no reason.  - Fainting or seizures.  - Symptoms may go away then come back suddenly.      Please follow up with neurology in 3 months. You may call any of the following neurology clinics for an appointment or a clinic of your choice. Tell them you were recently hospitalized and need follow up as recommended at discharge.    1. [facility]   [address], [address]   [place]  [address]   [phone]  2. [facility]   [address] [facility], [address]   [place] [address]   [phone]  3. [facility] - Kayli Mota MD   [address] [place], [place] [id number] [phone]  4. [facility] - Carolyne Mota MD   [address]   [place], [place] [id number] [phone]      Neurology also recommends follow-up MRA

## 2018-10-15 NOTE — DISCHARGE SUMMARY
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 She was found to have a dissection of the distal right vertebral artery. She was seen by neurology and they recommended aggressive risk factor control and ASA 325mg long term. She fortunately had no neurologic deficit.  Due to her LDL being over 100, she was started on Atorvastatin 40mg and her goal LDL should be under 70. We discussed weight loss and light exercise such as walking If she is doing well, she could resume light work next week    Hospital Course   Mi Waller was admitted on 10/13/2018.  The following problems were addressed during her hospitalization:    Principal Problem:    Vertebral artery dissection (H) right   Active Problems:    Benign essential hypertension    Hyperlipidemia LDL goal <100    Prediabetes      # Discharge Pain Plan:   - Patient currently has NO PAIN and is not being prescribed pain medications on discharge.    Mauricio Hudson MD    Significant Results and Procedures   Labs and imaging    Pending Results   These results will be followed up by Dr. Nishi Anders  Unresulted Labs Ordered in the Past 30 Days of this Admission     No orders found from 8/14/2018 to 10/14/2018.          Code Status   Full Code       Primary Care Physician   Nishi Anders V    Physical Exam   Temp: 98.3  F (36.8  C) Temp src: Oral BP: 134/82 Pulse: 62   Resp: 16 SpO2: 93 % O2 Device: None (Room air)    Vitals:    10/13/18 1440 10/14/18 0507 10/15/18 0500   Weight: 95.7 kg (211 lb) 95.9 kg (211 lb 6.7 oz) 93.8 kg  (206 lb 12.7 oz)     Vital Signs with Ranges  Temp:  [97.9  F (36.6  C)-98.5  F (36.9  C)] 98.3  F (36.8  C)  Pulse:  [57-82] 62  Resp:  [16-17] 16  BP: (118-162)/(62-90) 134/82  SpO2:  [93 %-96 %] 93 %       Constitutional: alert, cooperative   Eyes: clear  ENT: mucous membranes are normal  Respiratory: clear to A&P  Cardiovascular: Regular rhythm, normal S1 and S2, no murmurs or S3, no edema  GI: rounded, not tender, normal bowel sounds  Lymph/Hematologic: not examined  Genitourinary: not examined  Skin: bruising over the lumbar area (attempted but unsuccessful LP). No fever and not fluctulant  Musculoskeletal: not tender to touch over her right posterior neck.  Good rom of her neck  Neurologic: Cranial nerves II  Through XII are intact  , strength and coordination and speech is intact  Neuropsychiatric: alert, oriented, affable    Discharge Disposition   Discharged to home  Condition at discharge: Stable    Consultations This Hospital Stay   NEUROLOGY IP CONSULT  VASCULAR SURGERY IP CONSULT  PHYSICAL THERAPY ADULT IP CONSULT  OCCUPATIONAL THERAPY ADULT IP CONSULT  NUTRITION SERVICES ADULT IP CONSULT    Time Spent on this Encounter   I, Mauricio Hudson, personally saw the patient today and spent less than or equal to 30 minutes discharging this patient.    Discharge Orders     Nutrition Referral     Reason for your hospital stay   Mrs. Waller: You came to the hospital with an unusual headache. It turns out one of the arteries to the back of your brain, that is in the right side of your neck had torn.  Fortunately, this did not affect your brain. However, you need to be on a full aspirin pill each day and we need to treat your cholesterol aggressively, keep your blood pressure in control and control your sugar. I've suggest you start to lose weight and discussed Weight Watchers and Medifast if needed. Please discuss this with Dr. Anders.  I don't want you to work until Dr. Anders says you can go back to work. I want  you to see her the end of this week.  I'd suggest that Dr. Anders give you a note on the time back, maybe starting at half days and with the understanding that if your neck bothers you more, you be allowed to rest or go home.     Activity   Your activity upon discharge: ambulate in house ok to be up and about, but limit activity and no heavy lifting until you see the Neurologist     Full Code     Diet   Follow this diet upon discharge: Orders Placed This Encounter     Combination Diet 2283-0746 Calories: Low Consistent CHO (3-5 CHO units/meal); 3 gm NA Diet       Discharge Medications   Current Discharge Medication List      START taking these medications    Details   aspirin 325 MG tablet Take 1 tablet (325 mg) by mouth daily  Qty: 180 tablet    Associated Diagnoses: Vertebral artery dissection (H)      atorvastatin (LIPITOR) 40 MG tablet Take 1 tablet (40 mg) by mouth every evening  Qty: 30 tablet, Refills: 0    Associated Diagnoses: Hyperlipidemia LDL goal <100         CONTINUE these medications which have NOT CHANGED    Details   losartan (COZAAR) 50 MG tablet Take 50 mg by mouth daily         STOP taking these medications       ibuprofen (ADVIL/MOTRIN) 600 MG tablet Comments:   Reason for Stopping:             Allergies   Allergies   Allergen Reactions     Penicillins Itching     Data   Most Recent 3 CBC's:  Recent Labs   Lab Test  04/25/16   0552   WBC  6.8   HGB  16.7*   MCV  84   PLT  211      Most Recent 3 BMP's:  Recent Labs   Lab Test  10/15/18   0848  10/14/18   0550  04/25/16   0552   NA   --   143  139   POTASSIUM  4.0  3.4  2.7*   CHLORIDE   --   111*  102   CO2   --   28  28   BUN   --   11  18   CR   --   0.64  0.60   ANIONGAP   --   4  9   JEN   --   8.0*  8.6   GLC   --   94  124*     Most Recent 2 LFT's:  Recent Labs   Lab Test  10/14/18   0550  04/25/16   0552   AST  33  34   ALT  48  67*   ALKPHOS  101  117   BILITOTAL  0.3  0.6     Most Recent INR's and Anticoagulation Dosing  History:  Anticoagulation Dose History     There is no flowsheet data to display.        Most Recent 3 Troponin's:  Recent Labs   Lab Test  10/14/18   1428  10/14/18   0550  10/13/18   2240   TROPI  <0.015  <0.015  <0.015     Most Recent Cholesterol Panel:  Recent Labs   Lab Test  10/14/18   0550   CHOL  187   LDL  116*   HDL  39*   TRIG  158*     Most Recent 6 Bacteria Isolates From Any Culture (See EPIC Reports for Culture Details):No lab results found.  Most Recent TSH, T4 and A1c Labs:  Recent Labs   Lab Test  10/14/18   0550   A1C  5.6     Results for orders placed or performed during the hospital encounter of 10/13/18   CT Head w/o Contrast    Narrative    CT SCAN OF THE HEAD WITHOUT CONTRAST   10/13/2018 4:38 PM     HISTORY: Sudden onset headache, new, evaluate for ICH, mass effect,  other.    TECHNIQUE:  Axial images of the head and coronal reformations without  IV contrast material. Radiation dose for this scan was reduced using  automated exposure control, adjustment of the mA and/or kV according  to patient size, or iterative reconstruction technique.    COMPARISON: None.    FINDINGS:  Mild volume loss is present. White matter hypoattenuation  likely represents chronic small vessel ischemic change commensurate  with age. No evidence of acute ischemia, hemorrhage, mass, mass  effect, or hydrocephalus. The visualized calvarium, skull base,  paranasal sinuses, and extracranial soft tissues are unremarkable.      Impression    IMPRESSION: No acute intracranial abnormality.       MADDIE COTE MD   CTA Head with Contrast    Narrative    CT ANGIOGRAM OF THE HEAD AND NECK WITH CONTRAST  10/13/2018 7:17 PM     HISTORY: Evaluate for aneurysm, sudden onset headache, difficult LP.        TECHNIQUE:  CT angiography with an injection of 70 mL [id number]-370 IV  with scans through the head and neck. Images were transferred to a  separate 3-D workstation where multiplanar reformations and 3-D images  were created.  Estimates of carotid stenoses are made relative to the  distal internal carotid artery diameters except as noted. Radiation  dose for this scan was reduced using automated exposure control,  adjustment of the mA and/or kV according to patient size, or iterative  reconstruction technique.    COMPARISON: Same day head CT.     CT HEAD FINDINGS: No arterial phase enhancing lesions are identified.    CT ANGIOGRAM HEAD FINDINGS:    There is moderate to severe narrowing of the right vertebral artery V4  segment. Small suspected false lumen is present. The involved segment  includes the posterior inferior cerebellar artery origin. There is  slightly asymmetrically decreased contrast opacification of the right  posterior inferior cerebellar artery compared to the left. Left  vertebral artery and basilar artery are patent. The internal carotid  arteries, anterior cerebral arteries, middle cerebral arteries are  patent. No aneurysms are identified. Dural venous sinuses are  unremarkable. Left transverse sinus is dominant.      Impression    IMPRESSION:  Moderate to severe narrowing of the mid right vertebral  artery V4 segment over a 1 cm segment, age indeterminate. Differential  diagnosis includes nonocclusive dissection (favored), atypical  atherosclerotic plaquing (less likely). The involved segment contains  the PICA origin. There is slightly asymmetrically decreased contrast  opacification of the right PICA compared to the left which could be  physiologic, however acute narrowing cannot be excluded. This could be  further evaluated with angiography if clinically indicated.      MADDIE COTE MD   MR Brain w/o & w Contrast    Narrative    MRI BRAIN WITHOUT AND WITH CONTRAST  10/14/2018 4:39 AM    HISTORY:  eval CVA, vert artery dissection, RUE weakness;      TECHNIQUE:  Multiplanar, multisequence MRI of the brain without and  with 10 mL Gadavist    COMPARISON: None.    FINDINGS:  The brain parenchyma, ventricles and  subarachnoid spaces  appear normal. There is no evidence of hemorrhage, mass, acute  infarct, or anomaly.  There are no gadolinium enhancing lesions.   The  facial structures appear normal. The arteries at the base of the brain  and the dural venous sinuses appear patent.       Impression    IMPRESSION:  No acute pathology. No bleed, mass, or acute infarcts are  seen.    I agree with the preliminary report that was communicated to the  referring physician.      ALEN SOTO MD   MRA Neck (Carotids) wo & w Contrast    Narrative    MRA NECK (CAROTIDS) WO & W CONTRAST 10/14/2018 4:40 AM     HISTORY:  R posterior HA, likely R vert dissection;     TECHNIQUE:  Sequential axial images of the neck were obtained using  2-dimensional time-of-flight before contrast and 3-dimensional  time-of-flight after the uneventful administration of 10 mL Gadavist  iv contrast.    COMPARISON:  None.    FINDINGS: The postcontrast MR angiogram it is of relatively poor  quality, there is significant venous contamination which partially  obscures some of the neck vessels. Stenosis is relative to the distal  internal carotid diameter.    Right Carotid:  No significant stenosis is seen at the bifurcation  relative to the distal internal carotid diameter.    Left Carotid:  No significant stenosis is seen at the bifurcation  relative to the distal internal carotid diameter.    Vertebrals: Antegrade flow is seen in both vertebral arteries. The  right vertebral artery is relatively small. The left vertebral artery  is dominant. The V4 segment of the right distal vertebral artery is  not well seen on this study.    No arterial dissection is identified.      Impression    IMPRESSION: Unremarkable study. The vessels of the neck are patent.  The distal, V4 segment of the right vertebral artery is not well seen  on this study.    I agree with the preliminary report that was communicated to the  referring physician.    ALEN SOTO MD   MRA Brain ([place]) wo Contrast    Narrative    MR ANGIOGRAM OF THE HEAD WITHOUT CONTRAST   10/14/2018 3:29 PM     HISTORY: Right vertebral V4 disease to rule out dissection versus  atherosclerosis.      TECHNIQUE:  3D time-of-flight MR angiogram of the head without  contrast.    COMPARISON: CT angiogram dated 10/13/2018.    FINDINGS:  There is a stenosis in the V4 segment of the distal right  vertebral artery. This corresponds to the findings seen on the CT  angiogram of 10/14/2018. On the source images from the MR angiogram  there is slightly increased signal around the vertebral artery, just  proximal to the origin of the right posterior inferior cerebellar  artery.  This is seen on source images 49-57 of series 5. On the  fat-saturated T1-weighted images, there is also the suggestion of  intramural hematoma surrounding the distal vertebral artery. This is  seen on axial image 9 of series 9.      Impression    IMPRESSION:  Moderate-severe stenosis in the V4 segment of the distal  right vertebral artery. There appears to be high signal surrounding  the vessel on the source images which would suggest an intramural  hematoma. These findings would be most compatible with an arterial  dissection.      ALEN SOTO MD

## 2018-10-15 NOTE — PLAN OF CARE
Problem: Patient Care Overview  Goal: Plan of Care/Patient Progress Review  Outcome: No Change  A&Ox4. VSS on RA. Denies any pain or headache. All neuros and CMS intact. Ambulating independently in room. Tele Sinus riley. Will discharge to home tomorrow.  in room with pt.

## 2018-10-15 NOTE — PLAN OF CARE
Problem: Patient Care Overview  Goal: Plan of Care/Patient Progress Review  Outcome: No Change  A&O x4. Neuros intact, denies numbness/tingling. VSS. Tele NSR.  Low carb/low Na diet. Up independently; able to use call light appropriately.  at bedside. Reported moderate headache during night, decreased with PRN Tylenol. Denied further pain. Plan for discharge to home later today, continue monitoring

## 2018-10-15 NOTE — CONSULTS
NUTRITION EDUCATION      REASON FOR ASSESSMENT:  MD order -   Education        Reason for Consult: needs consistent carbohydrate, low fat and reduced calorie diet, needs education and life style changes.        Reference Links          NUTRITION HISTORY:  Information obtained from the pt and her .  Pt primary language is U[place] but she does speak English.      CURRENT DIET:  Low consistent carb, 3 gm Na diet.    NUTRITION DIAGNOSIS:  Food- and nutrition-related knowledge deficit R/t no prior exposure to the information, AEB MD order    INTERVENTIONS:    Nutrition Prescription:  Continue current diet    Implementation:      *  Nutrition Education (Content):   A)  Provided handout on LSF, low sodium (in U[place]bek)   B)  Discussed pre-diabetes (A1C 5.6) and portion control for carbs      *  Nutrition Education (Application):   A)  Discussed current eating habits and recommended alternative food choices      *  Anticipate fair compliance, pt needs more extensive 1:1 education as an outpatient      *  Diet Education - refer to Education Flowsheet    Goals:      *  Patient will verbalize understanding of the diet     Follow Up/Monitoring:      *  Provided RD contact information for future questions      *  Recommended Out-Patient Nutrition Referral    Barby Jones RD  Pager [phone] (M-F)            [phone] ([facility] Hol)

## 2018-10-16 NOTE — PLAN OF CARE
Problem: Patient Care Overview  Goal: Plan of Care/Patient Progress Review  Outcome: Adequate for Discharge Date Met: 10/15/18  Neuro exam intact except for occipital headache and slight pressure in right side of neck.   Pain decreased with Tylenol and ice packs, declines Oxycodone.  Not light sensitive but sounds bothersome per pt.  NIH done at discharge due to possible TIA, score 0.  Up independently in room.  S&S for stroke, risk factors and dissection education given to patient in Ethiopian and explained with  present.  Discharge instructions reviewed with patient and  via  phone.  Appointment made for patient to see her PCP this Wednesday and she is aware she needs to make an appt at [facility] for 3 months and  her new prescriptions at her pharmacy.  All questions answered.

## 2021-08-20 ENCOUNTER — APPOINTMENT (OUTPATIENT)
Dept: INTERPRETER SERVICES | Facility: CLINIC | Age: 51
End: 2021-08-20
Payer: COMMERCIAL

## 2025-04-16 ENCOUNTER — HOSPITAL ENCOUNTER (EMERGENCY)
Facility: CLINIC | Age: 55
Discharge: HOME | End: 2025-04-16
Attending: EMERGENCY MEDICINE | Admitting: EMERGENCY MEDICINE
Payer: COMMERCIAL

## 2025-04-16 VITALS
OXYGEN SATURATION: 95 % | SYSTOLIC BLOOD PRESSURE: 157 MMHG | DIASTOLIC BLOOD PRESSURE: 101 MMHG | RESPIRATION RATE: 18 BRPM | BODY MASS INDEX: 36.71 KG/M2 | WEIGHT: 187 LBS | TEMPERATURE: 97.1 F | HEIGHT: 60 IN | HEART RATE: 80 BPM

## 2025-04-16 DIAGNOSIS — R42 VERTIGO: ICD-10-CM

## 2025-04-16 DIAGNOSIS — I10 HYPERTENSION, UNSPECIFIED TYPE: ICD-10-CM

## 2025-04-16 LAB
ANION GAP SERPL CALCULATED.3IONS-SCNC: 12 MMOL/L (ref 7–15)
ATRIAL RATE - MUSE: 73 BPM
BASOPHILS # BLD AUTO: 0 10E3/UL (ref 0–0.2)
BASOPHILS NFR BLD AUTO: 1 %
BUN SERPL-MCNC: 10.9 MG/DL (ref 6–20)
CALCIUM SERPL-MCNC: 8.9 MG/DL (ref 8.8–10.4)
CHLORIDE SERPL-SCNC: 104 MMOL/L (ref 98–107)
CREAT SERPL-MCNC: 0.55 MG/DL (ref 0.51–0.95)
DIASTOLIC BLOOD PRESSURE - MUSE: NORMAL MMHG
EGFRCR SERPLBLD CKD-EPI 2021: >90 ML/MIN/1.73M2
EOSINOPHIL # BLD AUTO: 0.1 10E3/UL (ref 0–0.7)
EOSINOPHIL NFR BLD AUTO: 2 %
ERYTHROCYTE [DISTWIDTH] IN BLOOD BY AUTOMATED COUNT: 13.3 % (ref 10–15)
GLUCOSE SERPL-MCNC: 108 MG/DL (ref 70–99)
HCO3 SERPL-SCNC: 24 MMOL/L (ref 22–29)
HCT VFR BLD AUTO: 48.3 % (ref 35–47)
HGB BLD-MCNC: 16 G/DL (ref 11.7–15.7)
HOLD SPECIMEN: NORMAL
IMM GRANULOCYTES # BLD: 0 10E3/UL
IMM GRANULOCYTES NFR BLD: 1 %
INTERPRETATION ECG - MUSE: NORMAL
LYMPHOCYTES # BLD AUTO: 1.5 10E3/UL (ref 0.8–5.3)
LYMPHOCYTES NFR BLD AUTO: 24 %
MCH RBC QN AUTO: 27.7 PG (ref 26.5–33)
MCHC RBC AUTO-ENTMCNC: 33.1 G/DL (ref 31.5–36.5)
MCV RBC AUTO: 84 FL (ref 78–100)
MONOCYTES # BLD AUTO: 0.5 10E3/UL (ref 0–1.3)
MONOCYTES NFR BLD AUTO: 8 %
NEUTROPHILS # BLD AUTO: 4.1 10E3/UL (ref 1.6–8.3)
NEUTROPHILS NFR BLD AUTO: 66 %
NRBC # BLD AUTO: 0 10E3/UL
NRBC BLD AUTO-RTO: 0 /100
P AXIS - MUSE: 42 DEGREES
PLATELET # BLD AUTO: 226 10E3/UL (ref 150–450)
POTASSIUM SERPL-SCNC: 5.1 MMOL/L (ref 3.4–5.3)
PR INTERVAL - MUSE: 136 MS
QRS DURATION - MUSE: 112 MS
QT - MUSE: 424 MS
QTC - MUSE: 467 MS
R AXIS - MUSE: -57 DEGREES
RBC # BLD AUTO: 5.78 10E6/UL (ref 3.8–5.2)
SODIUM SERPL-SCNC: 140 MMOL/L (ref 135–145)
SYSTOLIC BLOOD PRESSURE - MUSE: NORMAL MMHG
T AXIS - MUSE: -4 DEGREES
TROPONIN T SERPL HS-MCNC: <6 NG/L
VENTRICULAR RATE- MUSE: 73 BPM
WBC # BLD AUTO: 6.2 10E3/UL (ref 4–11)

## 2025-04-16 PROCEDURE — 250N000011 HC RX IP 250 OP 636: Performed by: EMERGENCY MEDICINE

## 2025-04-16 PROCEDURE — 84484 ASSAY OF TROPONIN QUANT: CPT | Performed by: EMERGENCY MEDICINE

## 2025-04-16 PROCEDURE — 96374 THER/PROPH/DIAG INJ IV PUSH: CPT | Mod: 59

## 2025-04-16 PROCEDURE — 82565 ASSAY OF CREATININE: CPT | Performed by: EMERGENCY MEDICINE

## 2025-04-16 PROCEDURE — 36415 COLL VENOUS BLD VENIPUNCTURE: CPT | Performed by: EMERGENCY MEDICINE

## 2025-04-16 PROCEDURE — 80048 BASIC METABOLIC PNL TOTAL CA: CPT | Performed by: EMERGENCY MEDICINE

## 2025-04-16 PROCEDURE — 255N000002 HC RX 255 OP 636: Performed by: EMERGENCY MEDICINE

## 2025-04-16 PROCEDURE — A9585 GADOBUTROL INJECTION: HCPCS | Performed by: EMERGENCY MEDICINE

## 2025-04-16 PROCEDURE — 93005 ELECTROCARDIOGRAM TRACING: CPT

## 2025-04-16 PROCEDURE — 99285 EMERGENCY DEPT VISIT HI MDM: CPT | Mod: 25

## 2025-04-16 PROCEDURE — 250N000009 HC RX 250: Performed by: EMERGENCY MEDICINE

## 2025-04-16 PROCEDURE — 85025 COMPLETE CBC W/AUTO DIFF WBC: CPT | Performed by: EMERGENCY MEDICINE

## 2025-04-16 PROCEDURE — 250N000013 HC RX MED GY IP 250 OP 250 PS 637: Performed by: EMERGENCY MEDICINE

## 2025-04-16 PROCEDURE — 82374 ASSAY BLOOD CARBON DIOXIDE: CPT | Performed by: EMERGENCY MEDICINE

## 2025-04-16 RX ORDER — IOPAMIDOL 755 MG/ML
67 INJECTION, SOLUTION INTRAVASCULAR ONCE
Status: COMPLETED | OUTPATIENT
Start: 2025-04-16 | End: 2025-04-16

## 2025-04-16 RX ORDER — ONDANSETRON 2 MG/ML
4 INJECTION INTRAMUSCULAR; INTRAVENOUS ONCE
Status: COMPLETED | OUTPATIENT
Start: 2025-04-16 | End: 2025-04-16

## 2025-04-16 RX ORDER — MECLIZINE HYDROCHLORIDE 25 MG/1
25 TABLET ORAL ONCE
Status: COMPLETED | OUTPATIENT
Start: 2025-04-16 | End: 2025-04-16

## 2025-04-16 RX ORDER — GADOBUTROL 604.72 MG/ML
8 INJECTION INTRAVENOUS ONCE
Status: COMPLETED | OUTPATIENT
Start: 2025-04-16 | End: 2025-04-16

## 2025-04-16 RX ADMIN — SODIUM CHLORIDE 100 ML: 9 INJECTION, SOLUTION INTRAVENOUS at 10:58

## 2025-04-16 RX ADMIN — GADOBUTROL 8 ML: 604.72 INJECTION INTRAVENOUS at 14:50

## 2025-04-16 RX ADMIN — ONDANSETRON 4 MG: 2 INJECTION, SOLUTION INTRAMUSCULAR; INTRAVENOUS at 09:58

## 2025-04-16 RX ADMIN — MECLIZINE HYDROCHLORIDE 25 MG: 25 TABLET ORAL at 12:28

## 2025-04-16 RX ADMIN — IOPAMIDOL 67 ML: 755 INJECTION, SOLUTION INTRAVENOUS at 10:58

## 2025-04-16 ASSESSMENT — ACTIVITIES OF DAILY LIVING (ADL)
ADLS_ACUITY_SCORE: 41

## 2025-04-16 NOTE — ED PROVIDER NOTES
"  Emergency Department Note      History of Present Illness     Chief Complaint   Dizziness    History is provided by the patient, translated from [place] of [place] by a professional interpretor.     HPI   Mi Waller is a 54 year old female with a history of hypertension, hyperlipidemia, and vertebral artery dissection amongst others presenting to the ED with dizziness. She states that around 0530 today while in the shower she developed dizziness described as a room-spinning sensation that makes her lightheaded and nauseous. She states this dizziness is worsened positionally and severe to the extent she feels she will fall if she walks without assistance. She adds that she feels her upper lip is numb and further endorses a \"heat\" sensation rising up the back of her neck. She adds that her eyelids feel heavy as if she were tired. She denies headache, ear pain, or vision loss. Denies fever, cough, chest pain, ear pain, sore throat, shortness of breath, diarrhea, constipation, or urinary symptoms. She feels today symptoms are similar to those she experienced during her vertebral artery dissection in 2018. She followed with neurology shortly afterwards and was started on daily Aspirin 325mg. She has since ceased this medication as her neurologist didn't feel she needed it. She states the only medication she takes daily is phentermine.     Independent Historian   None    Review of External Notes   Reviewed 10/13/18 note. Admitted for right vertebral artery dissection with headache and neck pain. Placed on long term Aspirin.     Past Medical History     Medical History and Problem List   Hypertension  Hyperlipidemia  Prediabetes  Vertebral artery dissection  Nonalcoholic fatty liver disease    Medications   Aspirin 325mg  Lipitor  Cozaar  Glucophage  Estrace  Hyzaar  Phentermine    Surgical History   Hysterectomy     Physical Exam     Patient Vitals for the past 24 hrs:   BP Temp Temp src Pulse Resp SpO2 Height Weight "   04/16/25 1400 (!) 153/86 -- -- 70 -- 97 % -- --   04/16/25 1350 -- -- -- -- -- 97 % -- --   04/16/25 1340 -- -- -- -- -- 96 % -- --   04/16/25 1130 -- -- -- -- 18 96 % -- --   04/16/25 1115 (!) 155/89 -- -- -- -- -- -- --   04/16/25 1047 -- -- -- -- -- 92 % -- --   04/16/25 1004 -- -- -- -- -- 96 % -- --   04/16/25 1000 (!) 163/92 -- -- 80 -- 95 % -- --   04/16/25 0958 (!) 169/98 -- -- 79 -- 95 % -- --   04/16/25 0927 (!) 172/99 97.1  F (36.2  C) Temporal 79 16 98 % 1.524 m (5') 84.8 kg (187 lb)     Physical Exam  Physical Exam   Constitutional:  Patient is oriented to person, place, and time. They appear well-developed and well-nourished. Mild distress secondary to dizziness.   HENT:   Mouth/Throat:   Oropharynx is clear and moist.   Eyes:    Conjunctivae normal and EOM are normal. Pupils are equal, round, and reactive to light. No nystagmus  Neck:    Normal range of motion.   Cardiovascular: Normal rate, regular rhythm and normal heart sounds.  Exam reveals no gallop and no friction rub.  No murmur heard.  Pulmonary/Chest:  Effort normal and breath sounds normal. Patient has no wheezes. Patient has no rales.   Abdominal:   Soft. Bowel sounds are normal. Patient exhibits no mass. There is no tenderness. There is no rebound and no guarding.   Musculoskeletal:  Normal range of motion. Patient exhibits no edema.   Neurological:   Patient is alert and oriented to person, place, and time. Patient has normal strength. No cranial nerve deficit or sensory deficit. GCS 15. NIH stroke scale of zero.  Skin:   Skin is warm and dry. No rash noted. No erythema.   Psychiatric:   Patient has a normal mood and affect. Patient's behavior is normal. Judgment and thought content normal.    Diagnostics     Lab Results   Labs Ordered and Resulted from Time of ED Arrival to Time of ED Departure   BASIC METABOLIC PANEL - Abnormal       Result Value    Sodium 140      Potassium 5.1      Chloride 104      Carbon Dioxide (CO2) 24       Anion Gap 12      Urea Nitrogen 10.9      Creatinine 0.55      GFR Estimate >90      Calcium 8.9      Glucose 108 (*)    CBC WITH PLATELETS AND DIFFERENTIAL - Abnormal    WBC Count 6.2      RBC Count 5.78 (*)     Hemoglobin 16.0 (*)     Hematocrit 48.3 (*)     MCV 84      MCH 27.7      MCHC 33.1      RDW 13.3      Platelet Count 226      % Neutrophils 66      % Lymphocytes 24      % Monocytes 8      % Eosinophils 2      % Basophils 1      % Immature Granulocytes 1      NRBCs per 100 WBC 0      Absolute Neutrophils 4.1      Absolute Lymphocytes 1.5      Absolute Monocytes 0.5      Absolute Eosinophils 0.1      Absolute Basophils 0.0      Absolute Immature Granulocytes 0.0      Absolute NRBCs 0.0     TROPONIN T, HIGH SENSITIVITY - Normal    Troponin T, High Sensitivity <6         Imaging   MR Brain w/o & w Contrast   Final Result   IMPRESSION:      1.  No acute intracranial pathology.   2.  Mild degree of cerebral parenchymal volume loss and presumed small vessel ischemic disease.      CTA Head Neck with Contrast   Final Result   IMPRESSION:    HEAD CT:   1.  No acute intracranial abnormality.      NECK CTA:   1.  Patent neck vasculature.      HEAD CTA:    1.  No large vessel occlusion.      CT Head w/o Contrast   Final Result   IMPRESSION:    HEAD CT:   1.  No acute intracranial abnormality.      NECK CTA:   1.  Patent neck vasculature.      HEAD CTA:    1.  No large vessel occlusion.          EKG   ECG taken at 1010, ECG read at 1011  Normal sinus rhythm  Incomplete right bundle branch block  Left anterior fascicular block  T wave abnormality, consider anterior ischemia  Abnormal ECG   Rate 73 bpm. MS interval 136 ms. QRS duration 112 ms. QT/QTc 424/467 ms. P-R-T axes 42 -57 -4.    Independent Interpretation   None    ED Course      Medications Administered   Medications   ondansetron (ZOFRAN) injection 4 mg (4 mg Intravenous $Given 4/16/25 1439)   iopamidol (ISOVUE-370) solution 67 mL (67 mLs Intravenous $Given  4/16/25 1058)   sodium chloride 0.9 % bag for CT scan flush (100 mLs Intravenous $Given 4/16/25 1058)   meclizine (ANTIVERT) tablet 25 mg (25 mg Oral $Given 4/16/25 1228)   gadobutrol (GADAVIST) injection 8 mL (8 mLs Intravenous $Given 4/16/25 1450)       Procedures   Procedures     Discussion of Management   None    ED Course   ED Course as of 04/16/25 1508   Wed Apr 16, 2025   0991 I obtained history and examined the patient as noted above.   1209 I rechecked the patient. She adds that her dizziness worsens positionally.        Additional Documentation  None    Medical Decision Making / Diagnosis     CMS Diagnoses: None    MIPS       None    MDM   Mi Waller is a 54 year old female who presents to the emergency department with dizziness this is a spinning sensation she also appears achiness in her neck but no fevers she has a normal neurologic exam.  She felt that this was initially like her vertebral artery dissection which she had a few years ago.  I therefore did a CT CTA of her head neck which fortunately shows no acute findings.  Basic blood work also is reassuring.  Given that the CTs were negative I did speak with her about doing an MRI to further evaluate and rule out a posterior stroke.   Is noted that she has high blood pressure.  I do not think this is hypertensive emergency.  In addition her cardiac enzyme is normal.   Fortunately her MRI does not show any evidence of posterior stroke or other acute abnormalities.  This is all consistent with vertigo.  Will discharge her with vertigo discharge instructions and exercises.  She will follow-up with her primary care doctor.  In addition she will have her blood pressure rechecked when she follows up with her primary care doctor.  At this time there are no indications to start antihypertensives.  I expect they will have her recheck her blood pressure established patterns and if she is persist may need some medications.  If her symptoms persist her  primary care doctor can refer her for physical therapy..    Disposition   Patient was discharged    Diagnosis     ICD-10-CM    1. Hypertension, unspecified type  I10       2. Vertigo  R42            Discharge Medications   New Prescriptions    No medications on file     Scribe Disclosure:  I, MAIDA MILLAN, am serving as a scribe at 12:09 PM on 4/16/2025 to document services personally performed by Briseida Llamas MD based on my observations and the provider's statements to me.        Briseida Llamas MD  04/16/25 1661

## 2025-04-16 NOTE — ED TRIAGE NOTES
"Pt c/o dizziness starting this AM, no CP or SOB, however pt states she feels the same after 2018 when her \"blood vessel popped in her head\", hx of vertebral artery dissection in 2018, ABCD intact.       Triage Assessment (Adult)       Row Name 04/16/25 0930          Triage Assessment    Airway WDL WDL        Respiratory WDL    Respiratory WDL WDL        Skin Circulation/Temperature WDL    Skin Circulation/Temperature WDL WDL        Cardiac WDL    Cardiac WDL WDL        Peripheral/Neurovascular WDL    Peripheral Neurovascular WDL WDL        Cognitive/Neuro/Behavioral WDL    Cognitive/Neuro/Behavioral WDL WDL                     "

## 2025-04-16 NOTE — DISCHARGE INSTRUCTIONS
Your blood pressure is elevated.  I would follow-up with your primary care doctor for further monitoring.  You may take meclizine which is over-the-counter for dizziness.  If your dizziness persists your primary care doctor may refer you for physical therapy.

## 2025-04-16 NOTE — Clinical Note
Mi Waller was seen and treated in our emergency department on 4/16/2025.  She may return to work on 04/19/2025.       If you have any questions or concerns, please don't hesitate to call.      Briseida Llamas MD